# Patient Record
Sex: FEMALE | Race: WHITE | NOT HISPANIC OR LATINO | URBAN - METROPOLITAN AREA
[De-identification: names, ages, dates, MRNs, and addresses within clinical notes are randomized per-mention and may not be internally consistent; named-entity substitution may affect disease eponyms.]

---

## 2021-09-04 ENCOUNTER — EMERGENCY (EMERGENCY)
Facility: HOSPITAL | Age: 56
LOS: 1 days | Discharge: HOME | End: 2021-09-04
Attending: EMERGENCY MEDICINE | Admitting: EMERGENCY MEDICINE
Payer: MEDICARE

## 2021-09-04 VITALS
SYSTOLIC BLOOD PRESSURE: 153 MMHG | HEIGHT: 57 IN | TEMPERATURE: 98 F | DIASTOLIC BLOOD PRESSURE: 78 MMHG | HEART RATE: 85 BPM | RESPIRATION RATE: 20 BRPM | OXYGEN SATURATION: 98 %

## 2021-09-04 VITALS — OXYGEN SATURATION: 98 %

## 2021-09-04 LAB
RAPID RVP RESULT: SIGNIFICANT CHANGE UP
SARS-COV-2 RNA SPEC QL NAA+PROBE: SIGNIFICANT CHANGE UP

## 2021-09-04 PROCEDURE — 99283 EMERGENCY DEPT VISIT LOW MDM: CPT | Mod: 25

## 2021-09-04 PROCEDURE — 0225U NFCT DS DNA&RNA 21 SARSCOV2: CPT

## 2021-09-04 PROCEDURE — 71046 X-RAY EXAM CHEST 2 VIEWS: CPT

## 2021-09-04 PROCEDURE — 94640 AIRWAY INHALATION TREATMENT: CPT

## 2021-09-04 PROCEDURE — 99284 EMERGENCY DEPT VISIT MOD MDM: CPT | Mod: CS

## 2021-09-04 PROCEDURE — 71046 X-RAY EXAM CHEST 2 VIEWS: CPT | Mod: 26

## 2021-09-04 RX ORDER — ALBUTEROL 90 UG/1
1 AEROSOL, METERED ORAL ONCE
Refills: 0 | Status: COMPLETED | OUTPATIENT
Start: 2021-09-04 | End: 2021-09-04

## 2021-09-04 RX ORDER — ALBUTEROL 90 UG/1
2.5 AEROSOL, METERED ORAL ONCE
Refills: 0 | Status: COMPLETED | OUTPATIENT
Start: 2021-09-04 | End: 2021-09-04

## 2021-09-04 RX ORDER — GUAIFENESIN/DEXTROMETHORPHAN 600MG-30MG
10 TABLET, EXTENDED RELEASE 12 HR ORAL ONCE
Refills: 0 | Status: COMPLETED | OUTPATIENT
Start: 2021-09-04 | End: 2021-09-04

## 2021-09-04 RX ADMIN — Medication 10 MILLILITER(S): at 21:20

## 2021-09-04 RX ADMIN — ALBUTEROL 1 PUFF(S): 90 AEROSOL, METERED ORAL at 23:33

## 2021-09-04 RX ADMIN — ALBUTEROL 2.5 MILLIGRAM(S): 90 AEROSOL, METERED ORAL at 21:26

## 2021-09-04 RX ADMIN — Medication 60 MILLIGRAM(S): at 21:19

## 2021-09-04 NOTE — ED ADULT NURSE NOTE - NSICDXPASTMEDICALHX_GEN_ALL_CORE_FT
PAST MEDICAL HISTORY:  Achilles tendonitis     Acne     Cerebral Palsy     Dry eye syndrome     H/O chronic gastritis     Menopause     Mental Retardation     Mild intellectual disability     Osteopenia     Seizure

## 2021-09-04 NOTE — ED ADULT NURSE NOTE - CHPI ED NUR DURATION
Patient was informed that enalapril and HCTZ were both stopped. Per patient he reports misunderstanding provider and continued taking HCTZ, but stopped enalapril.     Due to propranolol being a high dose I explained to him that a message will be sent to provider. He does have enough to last until Monday.    Patient informed to elevate legs as much as possible.   week(s)

## 2021-09-04 NOTE — ED PROVIDER NOTE - OBJECTIVE STATEMENT
55 y.o. F BIB aide from group home for 1 wk cough, seen 5d ago at [facility], negative covid, started nasal spray, cough persists, no fever, no known sick contacts, no travel, not taking anything else for cough, feels better in shower

## 2021-09-04 NOTE — ED ADULT NURSE NOTE - OBJECTIVE STATEMENT
as per attendant pt with cough x1 week. vomited x1 today. pt awake and alert. airway patent. able to control her secretions. occasional cough noted. no respiratory distress

## 2021-09-04 NOTE — ED ADULT NURSE REASSESSMENT NOTE - NS ED NURSE REASSESS COMMENT FT1
pt and attendant instructed on use of inhaler and spacer. returned demonstration. d/c to attendant in NAD. ambulated unassisted. vs as charted

## 2021-09-04 NOTE — ED ADULT NURSE NOTE - NSIMPLEMENTINTERV_GEN_ALL_ED
Implemented All Fall with Harm Risk Interventions:  [place] to call system. Call bell, personal items and telephone within reach. Instruct patient to call for assistance. Room bathroom lighting operational. Non-slip footwear when patient is off stretcher. Physically safe environment: no spills, clutter or unnecessary equipment. Stretcher in lowest position, wheels locked, appropriate side rails in place. Provide visual cue, wrist band, yellow gown, etc. Monitor gait and stability. Monitor for mental status changes and reorient to person, place, and time. Review medications for side effects contributing to fall risk. Reinforce activity limits and safety measures with patient and family. Provide visual clues: red socks.

## 2021-09-04 NOTE — ED PROVIDER NOTE - CLINICAL SUMMARY MEDICAL DECISION MAKING FREE TEXT BOX
cough x 1 wk, not improving, sounds bronchitic, will check rvp, cxr r/o infiltrate, try albuterol, start steroids

## 2021-09-04 NOTE — ED PROVIDER NOTE - NSFOLLOWUPINSTRUCTIONS_ED_ALL_ED_FT
Recommend an over-the-counter cough/cold medication like Robitussin or Dayquil - see administration instructions on bottle      Acute Bronchitis, Adult       Acute bronchitis is when air tubes in the lungs (bronchi) suddenly get swollen. The condition can make it hard for you to breathe. In adults, acute bronchitis usually goes away within 2 weeks. A cough caused by bronchitis may last up to 3 weeks. Smoking, allergies, and asthma can make the condition worse.      What are the causes?  This condition is caused by:  •Cold and flu viruses. The most common cause of this condition is the virus that causes the common cold.       •Bacteria.     •Substances that irritate the lungs, including:   •Smoke from cigarettes and other types of tobacco.      •Dust and pollen.       •Fumes from chemicals, gases, or burned fuel.      •Other materials that pollute indoor or outdoor air.         •Close contact with someone who has acute bronchitis.        What increases the risk?  The following factors may make you more likely to develop this condition:  •A weak body's defense system. This is also called the immune system.       •Any condition that affects your lungs and breathing, such as asthma.        What are the signs or symptoms?  Symptoms of this condition include:  •A cough.      •Coughing up clear, yellow, or green mucus.      •Wheezing.      •Chest congestion.      •Shortness of breath.      •A fever.      •Body aches.      •Chills.      •A sore throat.        How is this treated?  Acute bronchitis may go away over time without treatment. Your doctor may recommend:  •Drinking more fluids.       •Taking a medicine for a fever or cough.       •Using a device that gets medicine into your lungs (inhaler).      •Using a vaporizer or a humidifier. These are machines that add water or moisture in the air to help with coughing and poor breathing.        Follow these instructions at home:     Activity     •Get a lot of rest.      •Avoid places where there are fumes from chemicals.      •Return to your normal activities as told by your doctor. Ask your doctor what activities are safe for you.      Lifestyle     •Drink enough fluids to keep your pee (urine) pale yellow.      • Do not drink alcohol.     • Do not use any products that contain nicotine or tobacco, such as cigarettes, e-cigarettes, and chewing tobacco. If you need help quitting, ask your doctor. Be aware that:  •Your bronchitis will get worse if you smoke or breathe in other people's smoke (secondhand smoke).      •Your lungs will heal faster if you quit smoking.        General instructions     •Take over-the-counter and prescription medicines only as told by your doctor.      •Use an inhaler, cool mist vaporizer, or humidifier as told by your doctor.      •Rinse your mouth often with salt water. To make salt water, dissolve ½–1 tsp (3–6 g) of salt in 1 cup (237 mL) of warm water.      •Keep all follow-up visits as told by your doctor. This is important.        How is this prevented?  To lower your risk of getting this condition again:  •Wash your hands often with soap and water. If soap and water are not available, use hand .      •Avoid contact with people who have cold symptoms.      •Try not to touch your mouth, nose, or eyes with your hands.      •Make sure to get the flu shot every year.        Contact a doctor if:    •Your symptoms do not get better in 2 weeks.      •You vomit more than once or twice.     •You have symptoms of loss of fluid from your body (dehydration). These include:   •Dark urine.       •Dry skin or eyes.      •Increased thirst.       •Headaches.       •Confusion.      •Muscle cramps.          Get help right away if:    •You cough up blood.      •You have chest pain.      •You have very bad shortness of breath.      •You become dehydrated.      •You faint or keep feeling like you are going to faint.      •You keep vomiting.      •You have a very bad headache.      •Your fever or chills get worse.      These symptoms may be an emergency. Do not wait to see if the symptoms will go away. Get medical help right away. Call your local emergency services (911 in the [place] Do not drive yourself to the hospital.       Summary    •Acute bronchitis is when air tubes in the lungs (bronchi) suddenly get swollen. In adults, acute bronchitis usually goes away within 2 weeks.      •Take over-the-counter and prescription medicines only as told by your doctor.      •Drink enough fluid to keep your pee (urine) pale yellow.      •Contact a doctor if your symptoms do not improve after 2 weeks of treatment.      •Get help right away if you cough up blood, faint, or have chest pain or shortness of breath.      This information is not intended to replace advice given to you by your health care provider. Make sure you discuss any questions you have with your health care provider.

## 2021-12-07 ENCOUNTER — EMERGENCY (EMERGENCY)
Facility: HOSPITAL | Age: 56
LOS: 1 days | Discharge: NURSING, REHAB OR LONG-TERM CARE | End: 2021-12-07
Attending: EMERGENCY MEDICINE | Admitting: EMERGENCY MEDICINE
Payer: MEDICARE

## 2021-12-07 VITALS
SYSTOLIC BLOOD PRESSURE: 150 MMHG | DIASTOLIC BLOOD PRESSURE: 92 MMHG | TEMPERATURE: 98 F | WEIGHT: 110.89 LBS | RESPIRATION RATE: 18 BRPM | HEART RATE: 100 BPM | OXYGEN SATURATION: 96 % | HEIGHT: 57 IN

## 2021-12-07 VITALS
TEMPERATURE: 98 F | DIASTOLIC BLOOD PRESSURE: 74 MMHG | OXYGEN SATURATION: 96 % | RESPIRATION RATE: 16 BRPM | HEART RATE: 76 BPM | SYSTOLIC BLOOD PRESSURE: 116 MMHG

## 2021-12-07 PROBLEM — H04.129 DRY EYE SYNDROME OF UNSPECIFIED LACRIMAL GLAND: Chronic | Status: ACTIVE | Noted: 2021-09-04

## 2021-12-07 PROBLEM — M85.80 OTHER SPECIFIED DISORDERS OF BONE DENSITY AND STRUCTURE, UNSPECIFIED SITE: Chronic | Status: ACTIVE | Noted: 2021-09-04

## 2021-12-07 PROBLEM — L70.9 ACNE, UNSPECIFIED: Chronic | Status: ACTIVE | Noted: 2021-09-04

## 2021-12-07 PROBLEM — Z78.0 ASYMPTOMATIC MENOPAUSAL STATE: Chronic | Status: ACTIVE | Noted: 2021-09-04

## 2021-12-07 PROBLEM — F70 MILD INTELLECTUAL DISABILITIES: Chronic | Status: ACTIVE | Noted: 2021-09-04

## 2021-12-07 PROBLEM — Z87.19 PERSONAL HISTORY OF OTHER DISEASES OF THE DIGESTIVE SYSTEM: Chronic | Status: ACTIVE | Noted: 2021-09-04

## 2021-12-07 PROBLEM — M76.60 ACHILLES TENDINITIS, UNSPECIFIED LEG: Chronic | Status: ACTIVE | Noted: 2021-09-04

## 2021-12-07 LAB
ALBUMIN SERPL ELPH-MCNC: 3.8 G/DL — SIGNIFICANT CHANGE UP (ref 3.3–5)
ALP SERPL-CCNC: 97 U/L — SIGNIFICANT CHANGE UP (ref 30–120)
ALT FLD-CCNC: 31 U/L DA — SIGNIFICANT CHANGE UP (ref 10–60)
ANION GAP SERPL CALC-SCNC: 10 MMOL/L — SIGNIFICANT CHANGE UP (ref 5–17)
APPEARANCE UR: CLEAR — SIGNIFICANT CHANGE UP
AST SERPL-CCNC: 31 U/L — SIGNIFICANT CHANGE UP (ref 10–40)
BASOPHILS # BLD AUTO: 0.03 K/UL — SIGNIFICANT CHANGE UP (ref 0–0.2)
BASOPHILS NFR BLD AUTO: 0.5 % — SIGNIFICANT CHANGE UP (ref 0–2)
BILIRUB SERPL-MCNC: 0.3 MG/DL — SIGNIFICANT CHANGE UP (ref 0.2–1.2)
BILIRUB UR-MCNC: NEGATIVE — SIGNIFICANT CHANGE UP
BUN SERPL-MCNC: 18 MG/DL — SIGNIFICANT CHANGE UP (ref 7–23)
CALCIUM SERPL-MCNC: 9.2 MG/DL — SIGNIFICANT CHANGE UP (ref 8.4–10.5)
CHLORIDE SERPL-SCNC: 100 MMOL/L — SIGNIFICANT CHANGE UP (ref 96–108)
CO2 SERPL-SCNC: 27 MMOL/L — SIGNIFICANT CHANGE UP (ref 22–31)
COLOR SPEC: YELLOW — SIGNIFICANT CHANGE UP
CREAT SERPL-MCNC: 0.76 MG/DL — SIGNIFICANT CHANGE UP (ref 0.5–1.3)
DIFF PNL FLD: NEGATIVE — SIGNIFICANT CHANGE UP
EOSINOPHIL # BLD AUTO: 0.16 K/UL — SIGNIFICANT CHANGE UP (ref 0–0.5)
EOSINOPHIL NFR BLD AUTO: 2.6 % — SIGNIFICANT CHANGE UP (ref 0–6)
GLUCOSE SERPL-MCNC: 143 MG/DL — HIGH (ref 70–99)
GLUCOSE UR QL: NEGATIVE MG/DL — SIGNIFICANT CHANGE UP
HCG UR QL: NEGATIVE — SIGNIFICANT CHANGE UP
HCT VFR BLD CALC: 44 % — SIGNIFICANT CHANGE UP (ref 34.5–45)
HGB BLD-MCNC: 14.8 G/DL — SIGNIFICANT CHANGE UP (ref 11.5–15.5)
IMM GRANULOCYTES NFR BLD AUTO: 0.2 % — SIGNIFICANT CHANGE UP (ref 0–1.5)
KETONES UR-MCNC: NEGATIVE — SIGNIFICANT CHANGE UP
LEUKOCYTE ESTERASE UR-ACNC: ABNORMAL
LYMPHOCYTES # BLD AUTO: 1.35 K/UL — SIGNIFICANT CHANGE UP (ref 1–3.3)
LYMPHOCYTES # BLD AUTO: 22.3 % — SIGNIFICANT CHANGE UP (ref 13–44)
MCHC RBC-ENTMCNC: 31 PG — SIGNIFICANT CHANGE UP (ref 27–34)
MCHC RBC-ENTMCNC: 33.6 GM/DL — SIGNIFICANT CHANGE UP (ref 32–36)
MCV RBC AUTO: 92.2 FL — SIGNIFICANT CHANGE UP (ref 80–100)
MONOCYTES # BLD AUTO: 0.45 K/UL — SIGNIFICANT CHANGE UP (ref 0–0.9)
MONOCYTES NFR BLD AUTO: 7.4 % — SIGNIFICANT CHANGE UP (ref 2–14)
NEUTROPHILS # BLD AUTO: 4.05 K/UL — SIGNIFICANT CHANGE UP (ref 1.8–7.4)
NEUTROPHILS NFR BLD AUTO: 67 % — SIGNIFICANT CHANGE UP (ref 43–77)
NITRITE UR-MCNC: NEGATIVE — SIGNIFICANT CHANGE UP
NRBC # BLD: 0 /100 WBCS — SIGNIFICANT CHANGE UP (ref 0–0)
PH UR: 6 — SIGNIFICANT CHANGE UP (ref 5–8)
PLATELET # BLD AUTO: 350 K/UL — SIGNIFICANT CHANGE UP (ref 150–400)
POTASSIUM SERPL-MCNC: 4.2 MMOL/L — SIGNIFICANT CHANGE UP (ref 3.5–5.3)
POTASSIUM SERPL-SCNC: 4.2 MMOL/L — SIGNIFICANT CHANGE UP (ref 3.5–5.3)
PROT SERPL-MCNC: 7.8 G/DL — SIGNIFICANT CHANGE UP (ref 6–8.3)
PROT UR-MCNC: NEGATIVE MG/DL — SIGNIFICANT CHANGE UP
RBC # BLD: 4.77 M/UL — SIGNIFICANT CHANGE UP (ref 3.8–5.2)
RBC # FLD: 13.2 % — SIGNIFICANT CHANGE UP (ref 10.3–14.5)
SODIUM SERPL-SCNC: 137 MMOL/L — SIGNIFICANT CHANGE UP (ref 135–145)
SP GR SPEC: 1.01 — SIGNIFICANT CHANGE UP (ref 1.01–1.02)
UROBILINOGEN FLD QL: NEGATIVE MG/DL — SIGNIFICANT CHANGE UP
WBC # BLD: 6.05 K/UL — SIGNIFICANT CHANGE UP (ref 3.8–10.5)
WBC # FLD AUTO: 6.05 K/UL — SIGNIFICANT CHANGE UP (ref 3.8–10.5)

## 2021-12-07 PROCEDURE — 70450 CT HEAD/BRAIN W/O DYE: CPT | Mod: MA

## 2021-12-07 PROCEDURE — 85025 COMPLETE CBC W/AUTO DIFF WBC: CPT

## 2021-12-07 PROCEDURE — 70450 CT HEAD/BRAIN W/O DYE: CPT | Mod: 26,MA

## 2021-12-07 PROCEDURE — 81025 URINE PREGNANCY TEST: CPT

## 2021-12-07 PROCEDURE — 36415 COLL VENOUS BLD VENIPUNCTURE: CPT

## 2021-12-07 PROCEDURE — 80053 COMPREHEN METABOLIC PANEL: CPT

## 2021-12-07 PROCEDURE — 81001 URINALYSIS AUTO W/SCOPE: CPT

## 2021-12-07 PROCEDURE — 99284 EMERGENCY DEPT VISIT MOD MDM: CPT

## 2021-12-07 PROCEDURE — 99284 EMERGENCY DEPT VISIT MOD MDM: CPT | Mod: 25

## 2021-12-07 PROCEDURE — 96360 HYDRATION IV INFUSION INIT: CPT

## 2021-12-07 RX ORDER — CEFUROXIME AXETIL 250 MG
1 TABLET ORAL
Qty: 14 | Refills: 0
Start: 2021-12-07 | End: 2021-12-13

## 2021-12-07 RX ORDER — SODIUM CHLORIDE 0.65 %
0 AEROSOL, SPRAY (ML) NASAL
Qty: 0 | Refills: 0 | DISCHARGE

## 2021-12-07 RX ORDER — SODIUM CHLORIDE 9 MG/ML
1000 INJECTION INTRAMUSCULAR; INTRAVENOUS; SUBCUTANEOUS ONCE
Refills: 0 | Status: COMPLETED | OUTPATIENT
Start: 2021-12-07 | End: 2021-12-07

## 2021-12-07 RX ORDER — CEFUROXIME AXETIL 250 MG
500 TABLET ORAL ONCE
Refills: 0 | Status: COMPLETED | OUTPATIENT
Start: 2021-12-07 | End: 2021-12-07

## 2021-12-07 RX ADMIN — SODIUM CHLORIDE 1000 MILLILITER(S): 9 INJECTION INTRAMUSCULAR; INTRAVENOUS; SUBCUTANEOUS at 12:15

## 2021-12-07 RX ADMIN — SODIUM CHLORIDE 1000 MILLILITER(S): 9 INJECTION INTRAMUSCULAR; INTRAVENOUS; SUBCUTANEOUS at 10:45

## 2021-12-07 RX ADMIN — Medication 500 MILLIGRAM(S): at 11:14

## 2021-12-07 NOTE — ED ADULT NURSE NOTE - OBJECTIVE STATEMENT
dizziness and headache today  staff and pt deny n/v/d  deny fever or chills or cough. staff states c/o headache and dizziness for past few months per program and they told this new staff member sometimes that is a sign of impending seizure but staff state pt has not had seizure in over a year. pt denies pains pt aaox3 skin warm and dry no resp distress lungs clear and equal b/l ascultation abd soft non tender + bs.  pt ambulatory with steady gait perrl.  pt denies dizziness now. pt does not like going to program because they do not take her out

## 2021-12-07 NOTE — ED PROVIDER NOTE - PATIENT PORTAL LINK FT
You can access the FollowMyHealth Patient Portal offered by [facility] by registering at the following website: [email or web address]. By joining [employer]’s FollowMyHealth portal, you will also be able to view your health information using other applications (apps) compatible with our system.

## 2021-12-07 NOTE — ED PROVIDER NOTE - OBJECTIVE STATEMENT
57 yo F p/w co woke up today with some dizziness, described as spinning sensation. Pt has had hx in past - had a few recent episodes assoc with a headache. no fall / recent trauma. no cough / uri. Pt fully vaccinated for covid, no known exposures. no agg/allev factors. NO other acute co or changes. No abd pain. no v/d. No recent illness. No recent travel/ sick contacts. No dyspnea. No chest pains. no abd pain. No v/d. No change in activity or behavior. no change in appetite / eating. no other acute co.

## 2021-12-07 NOTE — ED PROVIDER NOTE - PROGRESS NOTE DETAILS
Pt doing well, no acute co or changes. Pt is still asymptomatic. Will start abx for poss UTI, pt will fu with PMD asap and will return with any changes or concerns.

## 2021-12-07 NOTE — ED ADULT NURSE NOTE - NSIMPLEMENTINTERV_GEN_ALL_ED
Implemented All Universal Safety Interventions:  [place] to call system. Call bell, personal items and telephone within reach. Instruct patient to call for assistance. Room bathroom lighting operational. Non-slip footwear when patient is off stretcher. Physically safe environment: no spills, clutter or unnecessary equipment. Stretcher in lowest position, wheels locked, appropriate side rails in place.

## 2021-12-07 NOTE — ED PROVIDER NOTE - NSFOLLOWUPINSTRUCTIONS_ED_ALL_ED_FT
1) Follow-up with your Primary Medical Doctor, Dr Piedra. Call today for prompt follow-up.  2) Return to Emergency room for any worsening or persistent pain, weakness, fever, or any other concerning symptoms.  3) See attached instruction sheets for additional information, including information regarding signs and symptoms to look out for, reasons to seek immediate care and other important instructions.  4) Ceftin twice daily for 7 days  5) Plenty of fluids

## 2021-12-07 NOTE — ED PROVIDER NOTE - CLINICAL SUMMARY MEDICAL DECISION MAKING FREE TEXT BOX
Dizzy / vertigo today- resolved. Some recent headaches. Neuro intact, afebrile. check labs, CT, UA, IV

## 2022-05-22 NOTE — ED PROVIDER NOTE - PATIENT PORTAL LINK FT
No
You can access the FollowMyHealth Patient Portal offered by [facility] by registering at the following website: [email or web address]. By joining [employer]’s FollowMyHealth portal, you will also be able to view your health information using other applications (apps) compatible with our system.

## 2022-08-03 ENCOUNTER — INPATIENT (INPATIENT)
Facility: HOSPITAL | Age: 57
LOS: 4 days | Discharge: NURSING, REHAB OR LONG-TERM CARE | DRG: 390 | End: 2022-08-08
Attending: INTERNAL MEDICINE | Admitting: INTERNAL MEDICINE
Payer: MEDICARE

## 2022-08-03 VITALS
WEIGHT: 113.1 LBS | TEMPERATURE: 98 F | OXYGEN SATURATION: 96 % | SYSTOLIC BLOOD PRESSURE: 172 MMHG | HEIGHT: 57 IN | DIASTOLIC BLOOD PRESSURE: 96 MMHG | HEART RATE: 82 BPM | RESPIRATION RATE: 18 BRPM

## 2022-08-03 DIAGNOSIS — K56.609 UNSPECIFIED INTESTINAL OBSTRUCTION, UNSPECIFIED AS TO PARTIAL VERSUS COMPLETE OBSTRUCTION: ICD-10-CM

## 2022-08-03 LAB
ALBUMIN SERPL ELPH-MCNC: 4 G/DL — SIGNIFICANT CHANGE UP (ref 3.3–5)
ALP SERPL-CCNC: 108 U/L — SIGNIFICANT CHANGE UP (ref 30–120)
ALT FLD-CCNC: 38 U/L DA — SIGNIFICANT CHANGE UP (ref 10–60)
ANION GAP SERPL CALC-SCNC: 6 MMOL/L — SIGNIFICANT CHANGE UP (ref 5–17)
APPEARANCE UR: ABNORMAL
APTT BLD: 29.4 SEC — SIGNIFICANT CHANGE UP (ref 27.5–35.5)
AST SERPL-CCNC: 33 U/L — SIGNIFICANT CHANGE UP (ref 10–40)
BACTERIA # UR AUTO: ABNORMAL
BASOPHILS # BLD AUTO: 0.03 K/UL — SIGNIFICANT CHANGE UP (ref 0–0.2)
BASOPHILS NFR BLD AUTO: 0.4 % — SIGNIFICANT CHANGE UP (ref 0–2)
BILIRUB SERPL-MCNC: 0.1 MG/DL — LOW (ref 0.2–1.2)
BILIRUB UR-MCNC: NEGATIVE — SIGNIFICANT CHANGE UP
BUN SERPL-MCNC: 15 MG/DL — SIGNIFICANT CHANGE UP (ref 7–23)
CALCIUM SERPL-MCNC: 9.6 MG/DL — SIGNIFICANT CHANGE UP (ref 8.4–10.5)
CHLORIDE SERPL-SCNC: 101 MMOL/L — SIGNIFICANT CHANGE UP (ref 96–108)
CO2 SERPL-SCNC: 30 MMOL/L — SIGNIFICANT CHANGE UP (ref 22–31)
COLOR SPEC: YELLOW — SIGNIFICANT CHANGE UP
CREAT SERPL-MCNC: 0.83 MG/DL — SIGNIFICANT CHANGE UP (ref 0.5–1.3)
DIFF PNL FLD: NEGATIVE — SIGNIFICANT CHANGE UP
EGFR: 83 ML/MIN/1.73M2 — SIGNIFICANT CHANGE UP
EOSINOPHIL # BLD AUTO: 0.02 K/UL — SIGNIFICANT CHANGE UP (ref 0–0.5)
EOSINOPHIL NFR BLD AUTO: 0.2 % — SIGNIFICANT CHANGE UP (ref 0–6)
EPI CELLS # UR: SIGNIFICANT CHANGE UP
FLUAV AG NPH QL: SIGNIFICANT CHANGE UP
FLUBV AG NPH QL: SIGNIFICANT CHANGE UP
GLUCOSE SERPL-MCNC: 131 MG/DL — HIGH (ref 70–99)
GLUCOSE UR QL: NEGATIVE MG/DL — SIGNIFICANT CHANGE UP
HCG UR QL: NEGATIVE — SIGNIFICANT CHANGE UP
HCT VFR BLD CALC: 41 % — SIGNIFICANT CHANGE UP (ref 34.5–45)
HGB BLD-MCNC: 13.7 G/DL — SIGNIFICANT CHANGE UP (ref 11.5–15.5)
IMM GRANULOCYTES NFR BLD AUTO: 0.2 % — SIGNIFICANT CHANGE UP (ref 0–1.5)
INR BLD: 1.03 RATIO — SIGNIFICANT CHANGE UP (ref 0.88–1.16)
KETONES UR-MCNC: NEGATIVE — SIGNIFICANT CHANGE UP
LACTATE SERPL-SCNC: 1.1 MMOL/L — SIGNIFICANT CHANGE UP (ref 0.7–2)
LEUKOCYTE ESTERASE UR-ACNC: NEGATIVE — SIGNIFICANT CHANGE UP
LIDOCAIN IGE QN: 106 U/L — SIGNIFICANT CHANGE UP (ref 73–393)
LYMPHOCYTES # BLD AUTO: 0.88 K/UL — LOW (ref 1–3.3)
LYMPHOCYTES # BLD AUTO: 10.4 % — LOW (ref 13–44)
MCHC RBC-ENTMCNC: 31.4 PG — SIGNIFICANT CHANGE UP (ref 27–34)
MCHC RBC-ENTMCNC: 33.4 GM/DL — SIGNIFICANT CHANGE UP (ref 32–36)
MCV RBC AUTO: 94 FL — SIGNIFICANT CHANGE UP (ref 80–100)
MONOCYTES # BLD AUTO: 0.44 K/UL — SIGNIFICANT CHANGE UP (ref 0–0.9)
MONOCYTES NFR BLD AUTO: 5.2 % — SIGNIFICANT CHANGE UP (ref 2–14)
NEUTROPHILS # BLD AUTO: 7.11 K/UL — SIGNIFICANT CHANGE UP (ref 1.8–7.4)
NEUTROPHILS NFR BLD AUTO: 83.6 % — HIGH (ref 43–77)
NITRITE UR-MCNC: NEGATIVE — SIGNIFICANT CHANGE UP
NRBC # BLD: 0 /100 WBCS — SIGNIFICANT CHANGE UP (ref 0–0)
PH UR: 7 — SIGNIFICANT CHANGE UP (ref 5–8)
PLATELET # BLD AUTO: 299 K/UL — SIGNIFICANT CHANGE UP (ref 150–400)
POTASSIUM SERPL-MCNC: 3.8 MMOL/L — SIGNIFICANT CHANGE UP (ref 3.5–5.3)
POTASSIUM SERPL-SCNC: 3.8 MMOL/L — SIGNIFICANT CHANGE UP (ref 3.5–5.3)
PROT SERPL-MCNC: 7.7 G/DL — SIGNIFICANT CHANGE UP (ref 6–8.3)
PROT UR-MCNC: 15 MG/DL
PROTHROM AB SERPL-ACNC: 11.9 SEC — SIGNIFICANT CHANGE UP (ref 10.5–13.4)
RBC # BLD: 4.36 M/UL — SIGNIFICANT CHANGE UP (ref 3.8–5.2)
RBC # FLD: 13.4 % — SIGNIFICANT CHANGE UP (ref 10.3–14.5)
RBC CASTS # UR COMP ASSIST: NEGATIVE /HPF — SIGNIFICANT CHANGE UP (ref 0–4)
RSV RNA NPH QL NAA+NON-PROBE: SIGNIFICANT CHANGE UP
SARS-COV-2 RNA SPEC QL NAA+PROBE: SIGNIFICANT CHANGE UP
SODIUM SERPL-SCNC: 137 MMOL/L — SIGNIFICANT CHANGE UP (ref 135–145)
SP GR SPEC: 1.01 — SIGNIFICANT CHANGE UP (ref 1.01–1.02)
TROPONIN I, HIGH SENSITIVITY RESULT: <4 NG/L — SIGNIFICANT CHANGE UP
UROBILINOGEN FLD QL: NEGATIVE MG/DL — SIGNIFICANT CHANGE UP
WBC # BLD: 8.5 K/UL — SIGNIFICANT CHANGE UP (ref 3.8–10.5)
WBC # FLD AUTO: 8.5 K/UL — SIGNIFICANT CHANGE UP (ref 3.8–10.5)
WBC UR QL: SIGNIFICANT CHANGE UP

## 2022-08-03 PROCEDURE — 99222 1ST HOSP IP/OBS MODERATE 55: CPT

## 2022-08-03 PROCEDURE — 99285 EMERGENCY DEPT VISIT HI MDM: CPT | Mod: FS

## 2022-08-03 PROCEDURE — 93010 ELECTROCARDIOGRAM REPORT: CPT

## 2022-08-03 PROCEDURE — 74250 X-RAY XM SM INT 1CNTRST STD: CPT | Mod: 26

## 2022-08-03 PROCEDURE — 74177 CT ABD & PELVIS W/CONTRAST: CPT | Mod: 26,MA

## 2022-08-03 PROCEDURE — 71045 X-RAY EXAM CHEST 1 VIEW: CPT | Mod: 26

## 2022-08-03 RX ORDER — OMEPRAZOLE 10 MG/1
1 CAPSULE, DELAYED RELEASE ORAL
Qty: 0 | Refills: 0 | DISCHARGE

## 2022-08-03 RX ORDER — SODIUM CHLORIDE 9 MG/ML
1000 INJECTION INTRAMUSCULAR; INTRAVENOUS; SUBCUTANEOUS ONCE
Refills: 0 | Status: COMPLETED | OUTPATIENT
Start: 2022-08-03 | End: 2022-08-03

## 2022-08-03 RX ORDER — SODIUM CHLORIDE 9 MG/ML
1000 INJECTION, SOLUTION INTRAVENOUS
Refills: 0 | Status: DISCONTINUED | OUTPATIENT
Start: 2022-08-03 | End: 2022-08-07

## 2022-08-03 RX ORDER — SODIUM CHLORIDE 0.65 %
2 AEROSOL, SPRAY (ML) NASAL
Qty: 0 | Refills: 0 | DISCHARGE

## 2022-08-03 RX ORDER — LAMOTRIGINE 25 MG/1
300 TABLET, ORALLY DISINTEGRATING ORAL
Refills: 0 | Status: DISCONTINUED | OUTPATIENT
Start: 2022-08-03 | End: 2022-08-04

## 2022-08-03 RX ORDER — PSYLLIUM SEED (WITH DEXTROSE)
0 POWDER (GRAM) ORAL
Qty: 0 | Refills: 0 | DISCHARGE

## 2022-08-03 RX ORDER — ALBUTEROL 90 UG/1
0 AEROSOL, METERED ORAL
Qty: 0 | Refills: 0 | DISCHARGE

## 2022-08-03 RX ORDER — FAMOTIDINE 10 MG/ML
20 INJECTION INTRAVENOUS ONCE
Refills: 0 | Status: COMPLETED | OUTPATIENT
Start: 2022-08-03 | End: 2022-08-03

## 2022-08-03 RX ORDER — PRIMIDONE 250 MG/1
250 TABLET ORAL DAILY
Refills: 0 | Status: DISCONTINUED | OUTPATIENT
Start: 2022-08-03 | End: 2022-08-08

## 2022-08-03 RX ORDER — PRIMIDONE 250 MG/1
0 TABLET ORAL
Qty: 0 | Refills: 0 | DISCHARGE

## 2022-08-03 RX ORDER — LAMOTRIGINE 25 MG/1
2 TABLET, ORALLY DISINTEGRATING ORAL
Qty: 0 | Refills: 0 | DISCHARGE

## 2022-08-03 RX ORDER — PRIMIDONE 250 MG/1
500 TABLET ORAL
Refills: 0 | Status: DISCONTINUED | OUTPATIENT
Start: 2022-08-03 | End: 2022-08-08

## 2022-08-03 RX ORDER — ONDANSETRON 8 MG/1
4 TABLET, FILM COATED ORAL ONCE
Refills: 0 | Status: COMPLETED | OUTPATIENT
Start: 2022-08-03 | End: 2022-08-03

## 2022-08-03 RX ORDER — ICOSAPENT ETHYL 500 MG/1
2 CAPSULE, LIQUID FILLED ORAL
Qty: 0 | Refills: 0 | DISCHARGE

## 2022-08-03 RX ADMIN — SODIUM CHLORIDE 1000 MILLILITER(S): 9 INJECTION INTRAMUSCULAR; INTRAVENOUS; SUBCUTANEOUS at 16:31

## 2022-08-03 RX ADMIN — SODIUM CHLORIDE 1000 MILLILITER(S): 9 INJECTION INTRAMUSCULAR; INTRAVENOUS; SUBCUTANEOUS at 12:33

## 2022-08-03 RX ADMIN — SODIUM CHLORIDE 75 MILLILITER(S): 9 INJECTION, SOLUTION INTRAVENOUS at 19:56

## 2022-08-03 RX ADMIN — FAMOTIDINE 20 MILLIGRAM(S): 10 INJECTION INTRAVENOUS at 12:33

## 2022-08-03 RX ADMIN — ONDANSETRON 4 MILLIGRAM(S): 8 TABLET, FILM COATED ORAL at 12:33

## 2022-08-03 NOTE — ED PROVIDER NOTE - NS ED ATTENDING STATEMENT MOD
This was a shared visit with the RAYSHAWN. I reviewed and verified the documentation and independently performed the documented:

## 2022-08-03 NOTE — ED ADULT NURSE REASSESSMENT NOTE - NS ED NURSE REASSESS COMMENT FT1
Contact:  Froylan Reynagat. Manager Phyllis Posada [phone]  Froylan Davis [phone]
Changed securement device attached to nose at patient's request.  IV fluids infusing as ordered.  Call bell within reach.  Safety precautions maintained.

## 2022-08-03 NOTE — H&P ADULT - ASSESSMENT
56-year-old female with history of cerebral palsy, MR, seizures, chronic gastritis, bundle branch block brought in by staff from group home for evaluation of nausea, vomiting and abdominal pain today.  As per staff member patient was nauseous and  vomiting after breakfast this morning and on the way to the hospital started complaining of abdominal pain.  States that patient was recently hospitalized at [facility] for similar problem 3 weeks ago and unsure of diagnosis. Patient is a poor historian and unable to give further information. Denies fever, diarrhea, recent travel, known sick contacts, CP, SOB, urinary symptoms.     # SBO Surgery consult appreciated   Keep patient NPO iv fluids   iv Zofran prn for nausea     # Seizures Continue with Primidone and Lamotrigine

## 2022-08-03 NOTE — ED PROVIDER NOTE - OBJECTIVE STATEMENT
56-year-old female with history of cerebral palsy, MR, seizures, chronic gastritis, bundle branch block brought in by staff from group home for evaluation of nausea, vomiting and abdominal pain today.  As per staff member patient was nauseous and  vomiting after breakfast this morning and on the way to the hospital started complaining of abdominal pain.  States that patient was recently hospitalized at [facility] for similar problem 3 weeks ago and unsure of diagnosis. Patient is a poor historian and unable to give further information. Denies fever, diarrhea, recent travel, known sick contacts, CP, SOB, urinary symptoms.   pcp: Dr. Tye Arguello

## 2022-08-03 NOTE — ED PROVIDER NOTE - NONTENDER LOCATION
no rebound or guarding, multiple well healed surgical scars noted/left upper quadrant/right upper quadrant/left lower quadrant/right lower quadrant/periumbilical/umbilical/suprapubic/left costovertebral angle/right costovertebral angle no rebound, multiple well healed surgical scars noted/left upper quadrant/right upper quadrant/left lower quadrant/right lower quadrant/periumbilical/umbilical/suprapubic/left costovertebral angle/right costovertebral angle

## 2022-08-03 NOTE — H&P ADULT - HISTORY OF PRESENT ILLNESS
56-year-old female with history of cerebral palsy, MR, seizures, chronic gastritis, bundle branch block brought in by staff from group home for evaluation of nausea, vomiting and abdominal pain today.  As per staff member patient was nauseous and  vomiting after breakfast this morning and on the way to the hospital started complaining of abdominal pain.  States that patient was recently hospitalized at [facility] for similar problem 3 weeks ago and unsure of diagnosis. Patient is a poor historian and unable to give further information. Denies fever, diarrhea, recent travel, known sick contacts, CP, SOB, urinary symptoms.

## 2022-08-03 NOTE — ED ADULT NURSE NOTE - OBJECTIVE STATEMENT
Patient states, "I feel lousy.  My stomach hurts and I've been nauseas since this morning."  Patient vomiting on assessment.  History of intellectual disability; group home staff at bedside.  Patient is AOx4 and answering questions appropriately.  Denies CP or SOB. 46 year old female with hx of breast ca for bilateral revision reconstruction

## 2022-08-03 NOTE — ED PROVIDER NOTE - CLINICAL SUMMARY MEDICAL DECISION MAKING FREE TEXT BOX
56-year-old female with history of cerebral palsy mental retardation chronic gastritis brought by staff from [facility] for evaluation of nausea vomiting and abdominal pain today.  Staff member states that patient was recently hospitalized at [facility] for similar problem.  Unclear diagnosis.  History of right bundle branch block.  Patient unable to give any history complains of abdominal pain but denies other symptoms.  PCP is Avolese    VSS Afebrile, NAD  HEENT - clear  PERRL EOMI  Neck supple  lungs clear  Cor S1S2 RR - MGR  Abd soft nontender, no mass or HSM, no rebound, multiple well healed surgical scars.  Ext FROM intact, no edema  Neuro Intact, no deficits.  Skin Warm and dry no rash.    Imp Abd pain. Plan - Labs, Urine, may need CT for further eval.    I performed a history and physical exam of the patient and discussed their management with the advanced care provider. I reviewed the advanced care provider's note and agree with the documented findings and plan of care. My medical decision making and objective findings are found above.

## 2022-08-03 NOTE — ED PROVIDER NOTE - PROGRESS NOTE DETAILS
[de-identified] : Pt following up s/p Sinus, turbs, R SMG sialoendoscopy 10/20/2021, septum was not addressed. Pt states has been doing the saline washes and has been taking meds. \par Pt also states with some crusting \par pt states no improvement in nose as much, feels its the same as before. However, gland with improvement, states R SMG swelling also went down. \par Pt denies clear fluid d/c \par \par pt also with Left BRENDON, and CHl states no improvement, but stable- longstanding. \par no infections \par wanted to hold off on tube last visit.  Pt seen by surgery PA in ED. Spoke to Dr. Thompson who accepted admission. Spoke to surgeon, Dr. Amanda, who advised to admit pt to medicine and will have surgery PA consult pt in ED.

## 2022-08-04 LAB
ANION GAP SERPL CALC-SCNC: 11 MMOL/L — SIGNIFICANT CHANGE UP (ref 5–17)
BUN SERPL-MCNC: 11 MG/DL — SIGNIFICANT CHANGE UP (ref 7–23)
CALCIUM SERPL-MCNC: 9.3 MG/DL — SIGNIFICANT CHANGE UP (ref 8.4–10.5)
CHLORIDE SERPL-SCNC: 98 MMOL/L — SIGNIFICANT CHANGE UP (ref 96–108)
CK SERPL-CCNC: 52 U/L — SIGNIFICANT CHANGE UP (ref 26–192)
CO2 SERPL-SCNC: 28 MMOL/L — SIGNIFICANT CHANGE UP (ref 22–31)
CREAT SERPL-MCNC: 0.92 MG/DL — SIGNIFICANT CHANGE UP (ref 0.5–1.3)
CULTURE RESULTS: SIGNIFICANT CHANGE UP
EGFR: 73 ML/MIN/1.73M2 — SIGNIFICANT CHANGE UP
GLUCOSE BLDC GLUCOMTR-MCNC: 101 MG/DL — HIGH (ref 70–99)
GLUCOSE SERPL-MCNC: 109 MG/DL — HIGH (ref 70–99)
HCV AB S/CO SERPL IA: 0.78 S/CO — SIGNIFICANT CHANGE UP (ref 0–0.99)
HCV AB SERPL-IMP: SIGNIFICANT CHANGE UP
LACTATE SERPL-SCNC: 1 MMOL/L — SIGNIFICANT CHANGE UP (ref 0.7–2)
LACTATE SERPL-SCNC: 5.3 MMOL/L — CRITICAL HIGH (ref 0.7–2)
MRSA PCR RESULT.: SIGNIFICANT CHANGE UP
POTASSIUM SERPL-MCNC: 3.4 MMOL/L — LOW (ref 3.5–5.3)
POTASSIUM SERPL-SCNC: 3.4 MMOL/L — LOW (ref 3.5–5.3)
S AUREUS DNA NOSE QL NAA+PROBE: SIGNIFICANT CHANGE UP
SODIUM SERPL-SCNC: 137 MMOL/L — SIGNIFICANT CHANGE UP (ref 135–145)
SPECIMEN SOURCE: SIGNIFICANT CHANGE UP

## 2022-08-04 PROCEDURE — 99231 SBSQ HOSP IP/OBS SF/LOW 25: CPT

## 2022-08-04 PROCEDURE — 99291 CRITICAL CARE FIRST HOUR: CPT

## 2022-08-04 PROCEDURE — 74019 RADEX ABDOMEN 2 VIEWS: CPT | Mod: 26

## 2022-08-04 RX ORDER — LEVETIRACETAM 250 MG/1
500 TABLET, FILM COATED ORAL
Refills: 0 | Status: DISCONTINUED | OUTPATIENT
Start: 2022-08-04 | End: 2022-08-04

## 2022-08-04 RX ORDER — LEVETIRACETAM 250 MG/1
500 TABLET, FILM COATED ORAL ONCE
Refills: 0 | Status: COMPLETED | OUTPATIENT
Start: 2022-08-04 | End: 2022-08-04

## 2022-08-04 RX ORDER — ACETAMINOPHEN 500 MG
1000 TABLET ORAL ONCE
Refills: 0 | Status: COMPLETED | OUTPATIENT
Start: 2022-08-04 | End: 2022-08-04

## 2022-08-04 RX ORDER — DIAZEPAM 5 MG
2.5 TABLET ORAL ONCE
Refills: 0 | Status: DISCONTINUED | OUTPATIENT
Start: 2022-08-04 | End: 2022-08-04

## 2022-08-04 RX ORDER — LEVETIRACETAM 250 MG/1
500 TABLET, FILM COATED ORAL EVERY 12 HOURS
Refills: 0 | Status: DISCONTINUED | OUTPATIENT
Start: 2022-08-04 | End: 2022-08-05

## 2022-08-04 RX ADMIN — SODIUM CHLORIDE 75 MILLILITER(S): 9 INJECTION, SOLUTION INTRAVENOUS at 05:19

## 2022-08-04 RX ADMIN — Medication 2.5 MILLIGRAM(S): at 14:07

## 2022-08-04 RX ADMIN — LEVETIRACETAM 400 MILLIGRAM(S): 250 TABLET, FILM COATED ORAL at 14:14

## 2022-08-04 RX ADMIN — LEVETIRACETAM 400 MILLIGRAM(S): 250 TABLET, FILM COATED ORAL at 17:49

## 2022-08-04 RX ADMIN — Medication 400 MILLIGRAM(S): at 23:32

## 2022-08-04 RX ADMIN — SODIUM CHLORIDE 75 MILLILITER(S): 9 INJECTION, SOLUTION INTRAVENOUS at 17:51

## 2022-08-04 NOTE — CONSULT NOTE ADULT - SUBJECTIVE AND OBJECTIVE BOX
h/o sz npo add keppra 500 bid once able to take Lamictal recommend continue keppra for 24 hours then dc it   bzd for any break thru sz events 
GENERAL SURGERY CONSULT NOTE    HPI:  56 year old female presents to [facility] with group home aide with complaint of nausea, vomiting, dizzyness and abdominal pain occurring after eating breakfast at 8am.  Denies flatus or BM.  Per aide she was seen at [facility] 3 weeks ago for similar complaints and was diagnosed with a "heart issue" though work up (echo and stress test) were normal.    PAST MEDICAL & SURGICAL HISTORY:  Mental Retardation  Cerebral Palsy  Seizure  Mild intellectual disability  Osteopenia  Menopause  Achilles tendonitis  Dry eye syndrome  H/O chronic gastritis  Acne    Per aide and brother patient possibly had a small bowel resection at less than 5 years old, unsure of indication      Review of Systems:    Patient conversational, but not a good historian    MEDICATIONS:  Metamucil   Multivitamin  Primidone 250mg, 1 tab daily and 2 tabs at bedtime  Paroxetine  Lamotrigene  Rabeprazole  Loratadine  Vitamin D3  Calcium 600+D  Vascepa        Allergies    No Known Allergies        SOCIAL HISTORY          Smoking: Yes [ ]  No [X ]   ______pk yrs          ETOH  Yes [ ]  No [X ]  Social [ ]          DRUGS:  Yes [ ]  No [X ]  if so what______________      Vital Signs Last 24 Hrs  T(C): 36.4 (03 Aug 2022 11:57), Max: 36.4 (03 Aug 2022 11:57)  T(F): 97.5 (03 Aug 2022 11:57), Max: 97.5 (03 Aug 2022 11:57)  HR: 82 (03 Aug 2022 11:57) (82 - 82)  BP: 172/96 (03 Aug 2022 11:57) (172/96 - 172/96)  BP(mean): --  RR: 18 (03 Aug 2022 11:57) (18 - 18)  SpO2: 96% (03 Aug 2022 11:57) (96% - 96%)    Parameters below as of 03 Aug 2022 11:57  Patient On (Oxygen Delivery Method): room air        Physical Exam:    General:  Appears stated age, well-groomed, well-nourished, no distress  Eyes : PERRLA, EOM-I  Chest:  clear breath sounds  Cardiovascular:  Regular rate & rhythm, S1, S2  Abdomen:  soft, NT/ND, + well healed midline surgical scar, eccymosis RLQ (per aide from heparin injection during previous admission), +BS  Extremities: Full ROM    Skin:  eccymosis of RLQ of abdomen        LABS:                        13.7   8.50  )-----------( 299      ( 03 Aug 2022 12:39 )             41.0         137  |  101  |  15  ----------------------------<  131<H>  3.8   |  30  |  0.83    Ca    9.6      03 Aug 2022 12:39    TPro  7.7  /  Alb  4.0  /  TBili  0.1<L>  /  DBili  x   /  AST  33  /  ALT  38  /  AlkPhos  108        Urinalysis Basic - ( 03 Aug 2022 12:39 )    Color: Yellow / Appearance: Turbid / S.010 / pH: x  Gluc: x / Ketone: Negative  / Bili: Negative / Urobili: Negative mg/dL   Blood: x / Protein: 15 mg/dL / Nitrite: Negative   Leuk Esterase: Negative / RBC: Negative /HPF / WBC 0-2   Sq Epi: x / Non Sq Epi: Few / Bacteria: Few        RADIOLOGY & ADDITIONAL STUDIES:    ACC: [id number] EXAM:  CT ABDOMEN AND PELVIS IC                          PROCEDURE DATE:  2022          INTERPRETATION:  CLINICAL INFORMATION: Abdominal pain, vomiting. Recent   hospitalization at outside hospital for similar problem 3 weeks ago.    COMPARISON: None.    CONTRAST/COMPLICATIONS:  IV Contrast: Omnipaque 350  90 cc administered   10 cc discarded  Oral Contrast: NONE  Complications: None reported at time of study completion    PROCEDURE:  CT of the Abdomen and Pelvis was performed.  Sagittal and coronal reformats were performed.    FINDINGS:    LOWER CHEST: No visualized pleural effusion. Evaluation of the lower   thorax is limited due to motion.    LIVER: Liver size within normal limits. Main portal vein and hepatic   veinsare patent.  BILE DUCTS: Mild central biliary prominence compatible with   postcholecystectomy status.  GALLBLADDER: Cholecystectomy.  SPLEEN: Normal size  PANCREAS: No acute peripancreatic inflammation  ADRENALS: Indeterminate 7 mm left adrenal nodule, image 36 series 3.  KIDNEYS/URETERS: No hydronephrosis    BLADDER: Underdistended  REPRODUCTIVE ORGANS: Uterus and adnexa suboptimally characterized on CT.    BOWEL: Limited evaluation due to lack of oral contrast. Stomach is mildly   distended with mural thickening. Correlate for history of gastritis.   Proximal small bowel is nondistended. Mid to distal small bowel is   distended and fecalized with abrupt transition of the right hemiabdomen,   image 46 series 3, concerning for bowel obstruction. Distal small bowel   loops in the right lower quadrant are relatively underdistended. Proximal   to the region of transition, the mid small bowel demonstrate nonspecific   focal thickening, image 100 series 3. Appendix is nondistended and   noninflamed. Mild stool burden of the colon and areas of underdistention   limits evaluation of the colonic mucosa.  PERITONEUM: No ascites or portal/mesenteric venous gas.  VESSELS: No abdominal aortic aneurysm. Atheromatous changes.  RETROPERITONEUM/LYMPH NODES: Small volume nodes.  ABDOMINAL WALL: Postsurgical changes.  BONES: Degenerative changes.    IMPRESSION:    Mid to distal small bowel is distended and fecalized with abrupt   transition of the right hemiabdomen, image 46 series 3, concerning for   bowel obstruction. Distal small bowel loops in the right lower quadrant   are relatively underdistended. Proximal to the region of small bowel   transition, the mid small bowel demonstrate nonspecific focal thickening,   image 100 series 3. CT orMR enterography can be considered for further   characterization. Additional findings of gastritis.  Findings discussed with Dr. Eaton from ER on 8/3/2022 at 2:29 PM    Indeterminate 7 mm left adrenal nodule.    --- End of Report ---            ALIDA CASTELLANOS M.D., ATTENDING RADIOLOGIST  This document has been electronically signed. Aug  3 2022  2:32PM    A/P:  56 year old female with possible dysmotility issues and partial sbo/segmental ileus    Admit to medicine  NG tube  gastrograffin challenge/small bowel series  serial abdominal exams  follow lab work  GI/DVT prophylaxis  Case and plan D/W Dr. Amanda as well as Group home RN Irma Jean Baptiste [phone]
History of Present Illness: The patient is a 56 year old female with a history of seizure d/o, CP, developmental delay, LBBB who presents with abdominal pain and nausea. She noted some upper abdominal pain radiating to chest. There was associated nausea and vomiting. No shortness of breath.    Past Medical/Surgical History:  seizure d/o, CP, developmental delay, LBBB     Medications:  Home Medications:  Calcium 600+D oral tablet: 1 tab(s) orally 2 times a day (03 Aug 2022 12:36)  carbamide peroxide 6.5% otic solution: 5 drop(s) to each affected ear 2 times a day for 7 days once a month (03 Aug 2022 16:07)  lamoTRIgine 150 mg oral tablet: 2 tab(s) orally 2 times a day (03 Aug 2022 16:07)  loratadine 10 mg oral tablet: 1 tab(s) orally once a day (03 Aug 2022 12:36)  Metamucil: 2 cap(s) orally once a day (03 Aug 2022 16:07)  multivitamin: orally once a day (03 Aug 2022 16:07)  PARoxetine 10 mg oral tablet: 1 tab(s) orally once a day (03 Aug 2022 16:07)  primidone 250 mg oral tablet: 1 tab(s) orally once a day (03 Aug 2022 16:07)  primidone 250 mg oral tablet: 2 tab(s) orally once a day (at bedtime) (03 Aug 2022 16:07)  RABEprazole 20 mg oral tablet, extended release: orally once a day (03 Aug 2022 16:07)  Saline Nasal Mist 0.65% nasal solution: 2 drop(s) nasal once a day (03 Aug 2022 16:07)  Vitamin D3 25 mcg (1000 intl units) oral tablet: 1 tab(s) orally once a day (03 Aug 2022 12:36)      Family History: Non-contributory family history of premature cardiovascular atherosclerotic disease    Social History: No tobacco, alcohol or drug use    Review of Systems:  General: No fevers, chills, weight gain  Skin: No rashes, color changes  Cardiovascular: No chest pain, orthopnea  Respiratory: No shortness of breath, cough  Gastrointestinal: No nausea, abdominal pain  Genitourinary: No incontinence, pain with urination  Musculoskeletal: No pain, swelling, decreased range of motion  Neurological: No headache, weakness  Psychiatric: No depression, anxiety  Endocrine: No weight gain, increased thirst  All other systems are comprehensively negative.    Physical Exam:  Vitals:        Vital Signs Last 24 Hrs  T(C): 36.8 (04 Aug 2022 06:12), Max: 36.9 (04 Aug 2022 00:05)  T(F): 98.2 (04 Aug 2022 06:12), Max: 98.5 (04 Aug 2022 00:05)  HR: 65 (04 Aug 2022 06:12) (65 - 82)  BP: 145/77 (04 Aug 2022 06:12) (136/80 - 172/96)  BP(mean): --  RR: 16 (04 Aug 2022 06:12) (16 - 18)  SpO2: 99% (04 Aug 2022 06:12) (96% - 99%)    Parameters below as of 04 Aug 2022 06:12  Patient On (Oxygen Delivery Method): room air      General: NAD  HEENT: MMM  Neck: No JVD, no carotid bruit  Lungs: CTAB  CV: RRR, nl S1/S2, no M/R/G  Abdomen: S/NT/ND, +BS  Extremities: No LE edema, no cyanosis  Neuro: AAOx3, non-focal  Skin: No rash    Labs:                        13.7   8.50  )-----------( 299      ( 03 Aug 2022 12:39 )             41.0     08-03    137  |  101  |  15  ----------------------------<  131<H>  3.8   |  30  |  0.83    Ca    9.6      03 Aug 2022 12:39    TPro  7.7  /  Alb  4.0  /  TBili  0.1<L>  /  DBili  x   /  AST  33  /  ALT  38  /  AlkPhos  108  08-03        PT/INR - ( 03 Aug 2022 19:26 )   PT: 11.9 sec;   INR: 1.03 ratio         PTT - ( 03 Aug 2022 19:26 )  PTT:29.4 sec    ECG: NSR, LBBB

## 2022-08-04 NOTE — CHART NOTE - NSCHARTNOTEFT_GEN_A_CORE
Rapid response called due to altered mental status.   patient had recently been seen talking, walking and acting normally and then found in bed minimally responsive, pulled out NGT, but moving around and occaisionally opening eyes.   appears to be post -ictal, has been missing her lamotrigine and not yet received IV Keppra.     PHYSICAL EXAM:  Vital Signs Last 24 Hrs  T(C): 36.8 (04 Aug 2022 12:30), Max: 36.9 (04 Aug 2022 00:05)  T(F): 98.3 (04 Aug 2022 12:30), Max: 98.5 (04 Aug 2022 00:05)  HR: 68 (04 Aug 2022 12:30) (65 - 68)  BP: 160/82 (04 Aug 2022 12:30) (134/80 - 160/82)  BP(mean): --  RR: 20 (04 Aug 2022 12:30) (16 - 20)  SpO2: 98% (04 Aug 2022 12:30) (97% - 99%)    Parameters below as of 04 Aug 2022 12:30  Patient On (Oxygen Delivery Method): room air    GENERAL: NAD  EYES: conjunctiva and sclera clear  ENMT: Moist mucous membranes  NECK: Supple, moving voluntarily  NERVOUS SYSTEM:  opens eyes to pain, moving around in the bed, non-cooperative with commands  CHEST/LUNG: Clear to auscultation bilaterally;   HEART: Regular rate and rhythm;   ABDOMEN: Soft, Nontender, Nondistended; Bowel sounds present  EXTREMITIES:  2+ Peripheral Pulses, No edema      Placed on tele - sinus tachy with LBBB    Called Dr Howe - recommended check electrolytes, IV Valium 2.5mg x1, IV Keppra 500 X1 now and then give 6pm dose as scheduled.    remote tele monitor to see if she has seizure activity. seizure precautions.     Surgical PA present and aware NGT is out.  See separate note.    Dr Thompson informed of events and plan.  to follow up on electrolytes. Rapid response called due to altered mental status.   patient had recently been seen talking, walking and acting normally and then found in bed minimally responsive, pulled out NGT, but moving around and occaisionally opening eyes.   appears to be post -ictal, has been missing her lamotrigine and not yet received IV Keppra.     PHYSICAL EXAM:  Vital Signs Last 24 Hrs  T(C): 36.8 (04 Aug 2022 12:30), Max: 36.9 (04 Aug 2022 00:05)  T(F): 98.3 (04 Aug 2022 12:30), Max: 98.5 (04 Aug 2022 00:05)  HR: 68 (04 Aug 2022 12:30) (65 - 68)  BP: 160/82 (04 Aug 2022 12:30) (134/80 - 160/82)  BP(mean): --  RR: 20 (04 Aug 2022 12:30) (16 - 20)  SpO2: 98% (04 Aug 2022 12:30) (97% - 99%)    Parameters below as of 04 Aug 2022 12:30  Patient On (Oxygen Delivery Method): room air    GENERAL: NAD  EYES: conjunctiva and sclera clear  ENMT: Moist mucous membranes  NECK: Supple, moving voluntarily  NERVOUS SYSTEM:  opens eyes to pain, moving around in the bed, non-cooperative with commands  CHEST/LUNG: Clear to auscultation bilaterally;   HEART: Regular rate and rhythm;   ABDOMEN: Soft, Nontender, Nondistended; Bowel sounds present  EXTREMITIES:  2+ Peripheral Pulses, No edema      Placed on tele - sinus tachy with LBBB    Called Dr Howe - recommended check electrolytes, IV Valium 2.5mg x1, IV Keppra 500 X1 now and then give 6pm dose as scheduled.    remote tele monitor to see if she has seizure activity. seizure precautions.     Surgical PA present and aware NGT is out.  See separate note.    Dr Thompson informed of events and plan.  to follow up on electrolytes.    Critical care time with patient and speaking with physicians coordinating care approx 40 minutes.

## 2022-08-04 NOTE — CONSULT NOTE ADULT - ASSESSMENT
The patient is a 56 year old female with a history of seizure d/o, CP, developmental delay, LBBB who presents with abdominal pain and nausea in the setting of SBO.    Plan:  - ECG with known LBBB  - Cardiac enzymes negative  - NGT in place  - Continue IV fluids  - Surgery follow-up  - If plan includes any surgical intervention, the patient is optimized to proceed from a cardiac standpoint
Surg. Att.   I was called by ER PA re the pt. with nausea and vomiting. Pt is not the best of historian due to cerebral palsy and lives in a Nursing Home, but i reviewed her medical /surgical history which is quite extensive.   I also reviewed her ct with Dr. Varela. Based on this she may have some dysmotility issues and partial sbo/segmental ileus. Colon is filled with gas and feces.  Surgical PANydia is seeing the pt.  After discussing ct with Radiology we agree the pt should receive gastrografin challenge.  Pt should be admitted to Medicine with our follow up.  Full consult to follow.

## 2022-08-04 NOTE — PROGRESS NOTE ADULT - ASSESSMENT
56-year-old female with history of cerebral palsy, MR, seizures, chronic gastritis, bundle branch block brought in by staff from group home for evaluation of nausea, vomiting and abdominal pain today.  As per staff member patient was nauseous and  vomiting after breakfast this morning and on the way to the hospital started complaining of abdominal pain.  States that patient was recently hospitalized at [facility] for similar problem 3 weeks ago and unsure of diagnosis. Patient is a poor historian and unable to give further information. Denies fever, diarrhea, recent travel, known sick contacts, CP, SOB, urinary symptoms.       s/p Post Ictal Neuro following   Seizure meds iv Keppra     # SBO Surgery consult appreciated   Keep patient NPO iv fluids   iv Zofran prn for nausea   Off NGT     # Seizures Keppra

## 2022-08-04 NOTE — PROVIDER CONTACT NOTE (CRITICAL VALUE NOTIFICATION) - ACTION/TREATMENT ORDERED:
MD Thompson made aware, she does not want to give more fluids at this time, to order repeat lactate in 4 hrs

## 2022-08-04 NOTE — PROGRESS NOTE ADULT - SUBJECTIVE AND OBJECTIVE BOX
Patient is a 56y old  Female who presents with a chief complaint of Abdominal pain nausea and vomiting x 1 day (04 Aug 2022 09:19)      SUBJECTIVE / OVERNIGHT EVENTS: s/p RRT AMS   Post ictal   Off Ngt       MEDICATIONS  (STANDING):  lactated ringers. 1000 milliLiter(s) (75 mL/Hr) IV Continuous <Continuous>  levETIRAcetam  IVPB 500 milliGRAM(s) IV Intermittent every 12 hours  PARoxetine 10 milliGRAM(s) Oral daily  primidone 250 milliGRAM(s) Oral daily  primidone 500 milliGRAM(s) Oral <User Schedule>    MEDICATIONS  (PRN):      CAPILLARY BLOOD GLUCOSE      POCT Blood Glucose.: 101 mg/dL (04 Aug 2022 13:53)    I&O's Summary    03 Aug 2022 07:  -  04 Aug 2022 07:00  --------------------------------------------------------  IN: 0 mL / OUT: 500 mL / NET: -500 mL    04 Aug 2022 07:01  -  04 Aug 2022 23:59  --------------------------------------------------------  IN: 0 mL / OUT: 200 mL / NET: -200 mL      T(C): 36.9 (22 @ 21:10), Max: 37 (22 @ 17:14)  HR: 78 (22 @ 21:10) (68 - 80)  BP: 145/70 (22 @ 21:10) (136/76 - 160/82)  RR: 18 (22 @ 21:10) (17 - 20)  SpO2: 100% (22 @ 21:10) (98% - 100%)    PHYSICAL EXAM:  GENERAL: NAD, well-developed  HEAD:  Atraumatic, Normocephalic  EYES: EOMI, PERRLA, conjunctiva and sclera clear  NECK: Supple, No JVD  CHEST/LUNG: Clear to auscultation bilaterally; No wheeze  HEART: Regular rate and rhythm; No murmurs, rubs, or gallops  ABDOMEN: Soft, Nontender, Nondistended; Bowel sounds present  EXTREMITIES:  2+ Peripheral Pulses, No clubbing, cyanosis, or edema  PSYCH: AAOx3  NEUROLOGY: non-focal  SKIN: No rashes or lesions    LABS:                        13.7   8.50  )-----------( 299      ( 03 Aug 2022 12:39 )             41.0     08-04    137  |  98  |  11  ----------------------------<  109<H>  3.4<L>   |  28  |  0.92    Ca    9.3      04 Aug 2022 14:03    TPro  7.7  /  Alb  4.0  /  TBili  0.1<L>  /  DBili  x   /  AST  33  /  ALT  38  /  AlkPhos  108  08-03    PT/INR - ( 03 Aug 2022 19:26 )   PT: 11.9 sec;   INR: 1.03 ratio         PTT - ( 03 Aug 2022 19:26 )  PTT:29.4 sec  CARDIAC MARKERS ( 04 Aug 2022 14:03 )  x     / x     / 52 U/L / x     / x          Urinalysis Basic - ( 03 Aug 2022 12:39 )    Color: Yellow / Appearance: Turbid / S.010 / pH: x  Gluc: x / Ketone: Negative  / Bili: Negative / Urobili: Negative mg/dL   Blood: x / Protein: 15 mg/dL / Nitrite: Negative   Leuk Esterase: Negative / RBC: Negative /HPF / WBC 0-2   Sq Epi: x / Non Sq Epi: Few / Bacteria: Few        RADIOLOGY & ADDITIONAL TESTS:    Imaging Personally Reviewed:    Consultant(s) Notes Reviewed:      Care Discussed with Consultants/Other Providers:

## 2022-08-04 NOTE — PROGRESS NOTE ADULT - SUBJECTIVE AND OBJECTIVE BOX
Consultation    Inpatient consult to Infectious Diseases  Consult performed by: Raiza Hawkins DO  Consult ordered by: Paulina Greene MD           Patient Details:  Date: 8/11/2020   Riccardo Thomson  58year old male  [id number]  [birth year]    Chief Complaint: Right foot pain    Reason for consultation: Cellulitis    History of Presenting Illness: The patient is a 58year old male with PMH significant for T2D on insulin, CKD, HTN and HLD that presents to the hospital with a 3 day history of right foot pain. Patient stated he began noticing pain in his right foot and it progressed to the point where he was unable to place weight on it. Stated he was told he had an ulcer but was unaware of its presence. Denied any fevers, chest pain, shortness of breath, nausea, vomiting, diarrhea, dizziness or lightheadedness. Patient stated he is allergic to PCN but had many years ago. He did not remember his reaction. Of note, patient has been hospitalized last year for osteomyelitis in his 1st and 2nd metatarsal in his left foot. Had a bone culture in the OR that had no growth. Labs in the ED were notable for a CRP 11 and ESR 99. X-ray foot showed no signs of osteomyelitis but foreign body noted in heel. He was started on cefepime and daptomycin which patient states has helped with his pain. Past Medical History:  Past Medical History:   Diagnosis Date   â¢ Arthritis    â¢ Cellulitis of left foot 1/25/2019   â¢ Chronic pain    â¢ Congestive cardiac failure (CMS/HCC)    â¢ Diabetes mellitus (CMS/HCC)    â¢ Essential (primary) hypertension    â¢ HLD (hyperlipidemia) 1/25/2019   â¢ Melanoma (CMS/HCC)    â¢ Neuropathy    â¢ Retinopathy        Past Surgical History:  Past Surgical History:   Procedure Laterality Date   â¢ Foot surgery         Allergies:  ALLERGIES:   Allergen Reactions   â¢ Penicillins Other (See Comments)     Unknown         Past Family History:  No family history on file.     Past Social History:  Social History SURGERY    56 year old female admitted for possible dysmotility issues and partial sbo/segmental ileus    SUBJECTIVE:   Patient seen and examined at bedside.  NG tube in place.  Denies any further N/V, dizzyness or abdominal pain at this time.  Denies flatus or BM.    OBJECTIVE:   T(F): 98.2 (22 @ 06:12), Max: 98.5 (22 @ 00:05)  HR: 65 (22 @ 06:12) (65 - 82)  BP: 145/77 (22 @ 06:12) (136/80 - 172/96)  RR: 16 (22 @ 06:12) (16 - 18)  SpO2: 99% (22 @ 06:12) (96% - 99%)  CAPILLARY BLOOD GLUCOSE        I&O's Detail    03 Aug 2022 07:01  -  04 Aug 2022 07:00  --------------------------------------------------------  IN:  Total IN: 0 mL    OUT:    Nasogastric/Oral tube (mL): 500 mL  Total OUT: 500 mL    Total NET: -500 mL          Physical exam:  General:  NAD, NG tube in place  Chest:  clear breath sounds  Cardiovascular:  Regular rate & rhythm, S1, S2  Abdomen:  soft, NT/ND, + well healed midline surgical scar, ecchymosis RLQ, +BS  Skin:  ecchymosis of RLQ of abdomen    MEDICATIONS  (STANDING):  lactated ringers. 1000 milliLiter(s) (75 mL/Hr) IV Continuous <Continuous>  lamoTRIgine  Oral Tab/Cap - Peds 300 milliGRAM(s) Oral two times a day  PARoxetine 10 milliGRAM(s) Oral daily  primidone 250 milliGRAM(s) Oral daily  primidone 250 milliGRAM(s) Oral at bedtime    MEDICATIONS  (PRN):      LABS:                        13.7   8.50  )-----------( 299      ( 03 Aug 2022 12:39 )             41.0     08-03    137  |  101  |  15  ----------------------------<  131<H>  3.8   |  30  |  0.83    Ca    9.6      03 Aug 2022 12:39    TPro  7.7  /  Alb  4.0  /  TBili  0.1<L>  /  DBili  x   /  AST  33  /  ALT  38  /  AlkPhos  108  08-    PT/INR - ( 03 Aug 2022 19:26 )   PT: 11.9 sec;   INR: 1.03 ratio         PTT - ( 03 Aug 2022 19:26 )  PTT:29.4 sec  Urinalysis Basic - ( 03 Aug 2022 12:39 )    Color: Yellow / Appearance: Turbid / S.010 / pH: x  Gluc: x / Ketone: Negative  / Bili: Negative / Urobili: Negative mg/dL   Blood: x / Protein: 15 mg/dL / Nitrite: Negative   Leuk Esterase: Negative / RBC: Negative /HPF / WBC 0-2   Sq Epi: x / Non Sq Epi: Few / Bacteria: Few        RADIOLOGY & ADDITIONAL STUDIES:  small bowel series report PENDING    Assessment  56 year old female with possible dysmotility issues and partial sbo/segmental ileus    Continue NG tube to low wall suction  follow up results of gastrograffin challenge/small bowel series  serial abdominal exams  Await return of GI function  follow lab work  GI/DVT prophylaxis  Case and plan D/W Dr. Amanda     Tobacco Use   â¢ Smoking status: Heavy Tobacco Smoker     Years: 15.00     Types: Cigarettes   â¢ Smokeless tobacco: Never Used   â¢ Tobacco comment: 2 cigs a day   Substance Use Topics   â¢ Alcohol use: Yes     Alcohol/week: 1.0 standard drinks     Types: 1 Cans of beer per week       Home Medications:  Medications Prior to Admission   Medication Sig Dispense Refill   â¢ sertraline (ZOLOFT) 25 MG tablet Take 25 mg by mouth daily. â¢ furosemide (LASIX) 20 MG tablet Take 20 mg by mouth daily. â¢ oxyCODONE, IMM REL, (ROXICODONE) 15 MG immediate release tablet Take 15-30 mg by mouth every 6 hours as needed for Pain. â¢ senna (SENOKOT) 8.6 MG tablet Take 2 tablets by mouth daily. Do not start before March 17, 2020. 20 tablet 0   â¢ aspirin 81 MG tablet Take 1 tablet by mouth daily. 30 tablet 0   â¢ gabapentin (NEURONTIN) 300 MG capsule Take 2 capsules by mouth at bedtime. 60 capsule 0   â¢ polyethylene glycol (MIRALAX) packet Take 17 g by mouth daily. Do not start before March 17, 2020. 14 packet 0   â¢ amLODIPine (NORVASC) 5 MG tablet Take 5 mg by mouth daily. â¢ Cholecalciferol (VITAMIN D3) 50 mcg (2,000 units) tablet Take 50 mcg by mouth daily. â¢ docusate sodium (COLACE) 100 MG capsule Take 100 mg by mouth 2 times daily as needed for Constipation. â¢ tamsulosin (FLOMAX) 0.4 MG Cap Take 0.4 mg by mouth daily (with dinner). â¢ insulin glargine (LANTUS SOLOSTAR, BASAGLAR) 100 UNIT/ML pen-injector Inject 45 Units into the skin at bedtime. â¢ acetaminophen (TYLENOL) 325 MG tablet Take 650 mg by mouth every 4 hours as needed for Pain (mild). â¢ rosuvastatin (CRESTOR) 40 MG tablet Take 40 mg by mouth at bedtime. â¢ insulin aspart (NOVOLOG PENFILL) 100 UNIT/ML cartridge Inject 25 Units into the skin 3 times daily (before meals). â¢ carvedilol (COREG) 25 MG tablet Take 25 mg by mouth 2 times daily (with meals).          Current Medications:  Current Facility-Administered Medications   Medication Dose Route Frequency Provider Last Rate Last Dose   â¢ polyethylene glycol (MIRALAX) packet 17 g  17 g Oral Daily Adrienne Molina MD   17 g at 08/11/20 0749   â¢ senna (SENOKOT) 17.2 mg  2 tablet Oral Daily Adrienne Molina MD   17.2 mg at 08/11/20 0749   â¢ sertraline (ZOLOFT) tablet 25 mg  25 mg Oral Daily Adrienne Molina MD   25 mg at 08/11/20 0806   â¢ DAPTOmycin (CUBICIN) 420 mg in sodium chloride 0.9 % 100 mL IVPB  4 mg/kg (Adjusted) Intravenous Q24H Maureen Sandoval MD   Stopped at 08/10/20 1710   â¢ cefepime (MAXIPIME) 2,000 mg in sodium chloride 0.9 % 100 mL IVPB  2,000 mg Intravenous 3 times per day Maureen Sandoval  mL/hr at 08/11/20 0553 2,000 mg at 08/11/20 0553   â¢ acetaminophen (TYLENOL) tablet 650 mg  650 mg Oral Q4H PRN Maureen Sandoval MD   650 mg at 08/11/20 0749   â¢ amLODIPine (NORVASC) tablet 5 mg  5 mg Oral Daily Maureen Sandoval MD   5 mg at 08/11/20 0806   â¢ aspirin (ECOTRIN) enteric coated tablet 81 mg  81 mg Oral Daily Maureen Sandoval MD   81 mg at 08/11/20 0749   â¢ carvedilol (COREG) tablet 25 mg  25 mg Oral BID WC Maureen Sandoval MD   25 mg at 08/11/20 0749   â¢ gabapentin (NEURONTIN) capsule 600 mg  600 mg Oral QHS Maureen Saleh MD   600 mg at 08/10/20 2102   â¢ atorvastatin (LIPITOR) tablet 80 mg  80 mg Oral Nightly Maureen Saleh MD   80 mg at 08/10/20 2147   â¢ tamsulosin (FLOMAX) capsule 0.4 mg  0.4 mg Oral Daily with dinner Maureen Saleh MD       â¢ sodium chloride 0.9 % flush bag 25 mL  25 mL Intravenous PRN Maureen Saleh MD       â¢ sodium chloride (PF) 0.9 % injection 2 mL  2 mL Intracatheter 2 times per day Maureen Sandoval MD   2 mL at 08/11/20 0749   â¢ dextrose 50 % injection 25 g  25 g Intravenous PRN Althea Emanuel MD       â¢ dextrose 50 % injection 12.5 g  12.5 g Intravenous PRN Maureen Saleh MD       â¢ dextrose 5 % infusion   Intravenous Continuous PRN Maureen Fall MD       â¢ glucagon (GLUCAGEN) injection 1 mg  1 mg Intramuscular PRN Maureen Perez MD       â¢ dextrose (GLUTOSE) 40 % gel 15 g  15 g Oral PRN Maureen Fall MD       â¢ dextrose (GLUTOSE) 40 % gel 30 g  30 g Oral PRN Maureen Fall MD       â¢ enoxaparin (LOVENOX) injection 40 mg  40 mg Subcutaneous 2 times per day Maureen Perez MD   40 mg at 08/11/20 0749   â¢ insulin lispro (HumaLOG) scheduled dose AND correction dose   Subcutaneous TID WC Maureen Perez MD   2 Units at 08/11/20 0846   â¢ sodium chloride 0.9% infusion   Intravenous Continuous Maureen Perez MD 75 mL/hr at 08/10/20 1750     â¢ insulin glargine (LANTUS) injection 10 Units  10 Units Subcutaneous Nightly Maureen Perez MD   10 Units at 08/10/20 2147   â¢ insulin lispro (HumaLOG) injection 3 Units  3 Units Subcutaneous TID [facility] Maureen Fall MD   3 Units at 08/11/20 0845       Review of Systems:  A comprehensive 13 point ROS was done and is as per HPI otherwise negative. Vitals:  Vital Last Value 24 Hour Range   Temperature 97.7 Â°F (36.5 Â°C) Temp  Min: 97.2 Â°F (36.2 Â°C)  Max: 98.4 Â°F (36.9 Â°C)   Pulse 65 Pulse  Min: 65  Max: 83   Respiratory 18 Resp  Min: 15  Max: 20   Blood Pressure 101/53 BP  Min: 101/53  Max: 162/86   Pulse Oximetry 95 % SpO2  Min: 95 %  Max: 98 %   CVP   No data recorded     Vital Today Admit   Weight (!) 154.2 kg (340 lb) Weight: (!) 154.2 kg (340 lb)   Height N/A Height: 6' (182.9 cm)   BMI N/A BMI (Calculated): 46.11       Intake/Output Summary (Last 24 hours) at 8/11/2020 0849  Last data filed at 8/11/2020 0800  Gross per 24 hour   Intake 112.5 ml   Output 1501 ml   Net -1388.5 ml       Physical Exam  Constitutional:       Appearance: He is obese. HENT:      Head: Normocephalic and atraumatic. Nose: Nose normal.   Eyes:      General: No scleral icterus.      Extraocular Movements: Extraocular movements intact. Conjunctiva/sclera: Conjunctivae normal.      Pupils: Pupils are equal, round, and reactive to light. Cardiovascular:      Rate and Rhythm: Normal rate and regular rhythm. Pulses: Normal pulses. Heart sounds: Normal heart sounds. Pulmonary:      Effort: Pulmonary effort is normal.      Comments: Coarse expiratory breath sounds R>L  Skin:     Comments: Closed diabetic ulcer on the bottom of the right forefoot with surrounding erythema and swelling. Fluctuant mass palpated along with tenderness to palpation. Neurological:      Mental Status: He is alert. Laboratory Results:  Recent Labs   Lab 08/11/20  0805 08/10/20  1440   WBC 7.1 9.5   HCT 31.4* 30.5*   HGB 9.6* 9.7*    169   ANEUT  --  6.9   ALYMS  --  1.6   AJITH  --  0.8   AEOS  --  0.1   ABASO  --  0.0   SODIUM 136 135   POTASSIUM 4.0 4.2   CHLORIDE 106 103   CO2 24 28   GLUCOSE 189* 200*   BUN 25* 35*   CREATININE 0.98 1.15   AST 13 14   GPT 23 24   ALKPT 122* 131*   BILIRUBIN 0.7 0.7   CALCIUM 8.9 8.8   ALBUMIN 3.0* 3.1*   LACTA  --  0.7   MG 2.4  --        Radiology:  XR FOOT MIN 3 VIEWS RIGHT   Final Result          1. No radiographic evidence of acute osteomyelitis. 2.  Two foreign bodies within the lateral plantar soft tissues at the level of the distal calcaneus. Electronically Signed by: Shagufta James M.D. Signed on: 8/10/2020 3:48 PM              Assessment and Plan:  #Diabetic foot ulcer with surrounding cellulitis vs osteomyelitis  -Hx of left foot osteomyeliits, elevated ESR and CRP, XR right foot negative for osteomyelitis but presence of foreign body  -Recommend MRI right foot to evaluate potential abscess and/or underlying osteomyelitis  -Recommend ortho consult for foreign body seen on X-ray  -Continue daptomycin and cefepime for now    Discussed w Dr J Carlos Lloyd, D.O.    Internal Medicine Resident

## 2022-08-04 NOTE — PROVIDER CONTACT NOTE (CHANGE IN STATUS NOTIFICATION) - ASSESSMENT
Pt lethargic, arousable to voice, opens eyes, not answering questions appears altered, VSS Telephone Encounter by Mary Jane Grigsby, RN at 01/02/18 03:36 PM     Author:  Mary Jane Grigsby, RN Service:  (none) Author Type:  Registered Nurse     Filed:  01/02/18 03:43 PM Encounter Date:  11/3/2017 Status:  Signed     :  Mary Jane Grigsby RN (Registered Nurse)            Patient notified. Of message below[SG1.1T] Dr. SMITH   Offered appointment to discuss surgery   Would prefer appointment with Dr. SMITH :  1.19.18 Friday @ [facility] site or 1.29.18 12:30 pm @ [facility] ?[SG1.1M]        Patient stated the following :[SG1.1T] She would prefer 1.29.18 Dr. SMITH 12:30 pm @ [facility] site   Confirmed site with patient[SG1.1M]      Patient stated the following :[SG1.1T] she was previous patient of Dr. SMITH , she appreciates [place] appointment , this day works well for her.[SG1.1M]   Ana verbalized understanding of information given.[SG1.1T]    Booked appointment  Dr. SMITH 1.29.18 Monday @ 12:30 pm[SG1.1M]       Revision History        User Key Date/Time User Provider Type Action    > SG1.1 01/02/18 03:43 PM Mary Jane Grigsby, RN Registered Nurse Sign    M - Manual, T - Template

## 2022-08-04 NOTE — CHART NOTE - NSCHARTNOTEFT_GEN_A_CORE
Small bowel series and follow up abdominal xray reviewed with Dr. Amanda, contrast visualized in colon.  In light of these findings patient does not have small bowel obstruction, possible dysmotility issues.  Recommend further workup/evaluation by GI.  Of note, patient removed NG tube during seizure episode, OK to maintain NPO without NG tube, further diet advancement per GI. Small bowel series and follow up abdominal xray reviewed with Dr. Amanda, contrast visualized in colon.  In light of these findings patient does not have small bowel obstruction, possible dysmotility issues.  Recommend further workup/evaluation by GI.  Of note, patient removed NG tube during seizure episode, OK to maintain NPO without NG tube, further diet advancement per Medical team.  Surg. Att.  I reviewed UGI series.I agree with above statement. Diet as per medical /GI team.

## 2022-08-04 NOTE — PATIENT PROFILE ADULT - FUNCTIONAL ASSESSMENT - DAILY ACTIVITY SECTION LABEL
. normal... Well appearing, well nourished, awake, alert, oriented to person, place, time/situation and in no apparent distress.

## 2022-08-05 LAB
ANION GAP SERPL CALC-SCNC: 9 MMOL/L — SIGNIFICANT CHANGE UP (ref 5–17)
BASOPHILS # BLD AUTO: 0.04 K/UL — SIGNIFICANT CHANGE UP (ref 0–0.2)
BASOPHILS NFR BLD AUTO: 0.5 % — SIGNIFICANT CHANGE UP (ref 0–2)
BUN SERPL-MCNC: 14 MG/DL — SIGNIFICANT CHANGE UP (ref 7–23)
CALCIUM SERPL-MCNC: 8.7 MG/DL — SIGNIFICANT CHANGE UP (ref 8.4–10.5)
CHLORIDE SERPL-SCNC: 102 MMOL/L — SIGNIFICANT CHANGE UP (ref 96–108)
CO2 SERPL-SCNC: 28 MMOL/L — SIGNIFICANT CHANGE UP (ref 22–31)
CREAT SERPL-MCNC: 0.58 MG/DL — SIGNIFICANT CHANGE UP (ref 0.5–1.3)
EGFR: 106 ML/MIN/1.73M2 — SIGNIFICANT CHANGE UP
EOSINOPHIL # BLD AUTO: 0.16 K/UL — SIGNIFICANT CHANGE UP (ref 0–0.5)
EOSINOPHIL NFR BLD AUTO: 2.1 % — SIGNIFICANT CHANGE UP (ref 0–6)
GLUCOSE SERPL-MCNC: 72 MG/DL — SIGNIFICANT CHANGE UP (ref 70–99)
HCT VFR BLD CALC: 37.4 % — SIGNIFICANT CHANGE UP (ref 34.5–45)
HGB BLD-MCNC: 12.6 G/DL — SIGNIFICANT CHANGE UP (ref 11.5–15.5)
IMM GRANULOCYTES NFR BLD AUTO: 0.1 % — SIGNIFICANT CHANGE UP (ref 0–1.5)
LYMPHOCYTES # BLD AUTO: 1.86 K/UL — SIGNIFICANT CHANGE UP (ref 1–3.3)
LYMPHOCYTES # BLD AUTO: 24.9 % — SIGNIFICANT CHANGE UP (ref 13–44)
MCHC RBC-ENTMCNC: 32 PG — SIGNIFICANT CHANGE UP (ref 27–34)
MCHC RBC-ENTMCNC: 33.7 GM/DL — SIGNIFICANT CHANGE UP (ref 32–36)
MCV RBC AUTO: 94.9 FL — SIGNIFICANT CHANGE UP (ref 80–100)
MONOCYTES # BLD AUTO: 0.7 K/UL — SIGNIFICANT CHANGE UP (ref 0–0.9)
MONOCYTES NFR BLD AUTO: 9.4 % — SIGNIFICANT CHANGE UP (ref 2–14)
NEUTROPHILS # BLD AUTO: 4.69 K/UL — SIGNIFICANT CHANGE UP (ref 1.8–7.4)
NEUTROPHILS NFR BLD AUTO: 63 % — SIGNIFICANT CHANGE UP (ref 43–77)
NRBC # BLD: 0 /100 WBCS — SIGNIFICANT CHANGE UP (ref 0–0)
PLATELET # BLD AUTO: 259 K/UL — SIGNIFICANT CHANGE UP (ref 150–400)
POTASSIUM SERPL-MCNC: 3.7 MMOL/L — SIGNIFICANT CHANGE UP (ref 3.5–5.3)
POTASSIUM SERPL-SCNC: 3.7 MMOL/L — SIGNIFICANT CHANGE UP (ref 3.5–5.3)
RBC # BLD: 3.94 M/UL — SIGNIFICANT CHANGE UP (ref 3.8–5.2)
RBC # FLD: 12.9 % — SIGNIFICANT CHANGE UP (ref 10.3–14.5)
SODIUM SERPL-SCNC: 139 MMOL/L — SIGNIFICANT CHANGE UP (ref 135–145)
WBC # BLD: 7.46 K/UL — SIGNIFICANT CHANGE UP (ref 3.8–10.5)
WBC # FLD AUTO: 7.46 K/UL — SIGNIFICANT CHANGE UP (ref 3.8–10.5)

## 2022-08-05 RX ORDER — LAMOTRIGINE 25 MG/1
300 TABLET, ORALLY DISINTEGRATING ORAL
Refills: 0 | Status: DISCONTINUED | OUTPATIENT
Start: 2022-08-05 | End: 2022-08-08

## 2022-08-05 RX ADMIN — Medication 1000 MILLIGRAM(S): at 00:41

## 2022-08-05 RX ADMIN — LEVETIRACETAM 400 MILLIGRAM(S): 250 TABLET, FILM COATED ORAL at 06:13

## 2022-08-05 RX ADMIN — LAMOTRIGINE 300 MILLIGRAM(S): 25 TABLET, ORALLY DISINTEGRATING ORAL at 15:10

## 2022-08-05 RX ADMIN — Medication 10 MILLIGRAM(S): at 11:27

## 2022-08-05 RX ADMIN — PRIMIDONE 250 MILLIGRAM(S): 250 TABLET ORAL at 11:28

## 2022-08-05 RX ADMIN — PRIMIDONE 500 MILLIGRAM(S): 250 TABLET ORAL at 21:22

## 2022-08-05 NOTE — PROVIDER CONTACT NOTE (MEDICATION) - ACTION/TREATMENT ORDERED:
Restart lamotrigine PO 300mg 2x a day, first dose now. D/C IV Keppra
Dr. Thompson to consult with neurology

## 2022-08-05 NOTE — PROGRESS NOTE ADULT - ASSESSMENT
The patient is a 56 year old female with a history of seizure d/o, CP, developmental delay, LBBB who presents with abdominal pain and nausea in the setting of SBO.    Plan:  - ECG with known LBBB  - Cardiac enzymes negative  - NGT removed  - Continue IV fluids  - Surgery follow-up  - If plan includes any surgical intervention, the patient is optimized to proceed from a cardiac standpoint

## 2022-08-05 NOTE — DIETITIAN INITIAL EVALUATION ADULT - PERTINENT MEDS FT
MEDICATIONS  (STANDING):  lactated ringers. 1000 milliLiter(s) (75 mL/Hr) IV Continuous <Continuous>  lamoTRIgine 300 milliGRAM(s) Oral two times a day  PARoxetine 10 milliGRAM(s) Oral daily  primidone 250 milliGRAM(s) Oral daily  primidone 500 milliGRAM(s) Oral <User Schedule>    MEDICATIONS  (PRN):

## 2022-08-05 NOTE — PROGRESS NOTE ADULT - SUBJECTIVE AND OBJECTIVE BOX
Patient is a 56y old  Female who presents with a chief complaint of Abdominal pain nausea and vomiting x 1 day (04 Aug 2022 09:19)      SUBJECTIVE / OVERNIGHT EVENTS: s/p RRT AMS   Post ictal   Off Ngt       T(C): 36.8 (08-05-22 @ 17:01), Max: 36.8 (08-05-22 @ 17:01)  HR: 70 (08-05-22 @ 17:01) (70 - 73)  BP: 132/79 (08-05-22 @ 17:01) (132/79 - 134/66)  RR: 18 (08-05-22 @ 17:01) (16 - 18)  SpO2: 98% (08-05-22 @ 17:01) (98% - 98%)      MEDICATIONS  (STANDING):  lactated ringers. 1000 milliLiter(s) (75 mL/Hr) IV Continuous <Continuous>  lamoTRIgine 300 milliGRAM(s) Oral two times a day  PARoxetine 10 milliGRAM(s) Oral daily  primidone 250 milliGRAM(s) Oral daily  primidone 500 milliGRAM(s) Oral <User Schedule>    MEDICATIONS  (PRN):    T(C): 36.9 (08-04-22 @ 21:10), Max: 37 (08-04-22 @ 17:14)  HR: 78 (08-04-22 @ 21:10) (68 - 80)  BP: 145/70 (08-04-22 @ 21:10) (136/76 - 160/82)  RR: 18 (08-04-22 @ 21:10) (17 - 20)  SpO2: 100% (08-04-22 @ 21:10) (98% - 100%)    PHYSICAL EXAM:  GENERAL: NAD, well-developed  HEAD:  Atraumatic, Normocephalic  EYES: EOMI, PERRLA, conjunctiva and sclera clear  NECK: Supple, No JVD  CHEST/LUNG: Clear to auscultation bilaterally; No wheeze  HEART: Regular rate and rhythm; No murmurs, rubs, or gallops  ABDOMEN: Soft, Nontender, Nondistended; Bowel sounds present  EXTREMITIES:  2+ Peripheral Pulses, No clubbing, cyanosis, or edema  PSYCH: AAOx3  NEUROLOGY: non-focal  SKIN: No rashes or lesions                            12.6   7.46  )-----------( 259      ( 05 Aug 2022 06:00 )             37.4       CARDIAC MARKERS ( 04 Aug 2022 14:03 )  x     / x     / 52 U/L / x     / x              139|102|14<72  3.7|28|0.58  8.7,--,--  08-05 @ 06:00      RADIOLOGY & ADDITIONAL TESTS:    Imaging Personally Reviewed:    Consultant(s) Notes Reviewed:      Care Discussed with Consultants/Other Providers:

## 2022-08-05 NOTE — PROGRESS NOTE ADULT - SUBJECTIVE AND OBJECTIVE BOX
Neurology follow up note    DMITRI [id number]      Interval History:    Patient feels ok no new complaints.    Allergies    No Known Allergies    Intolerances        MEDICATIONS    lactated ringers. 1000 milliLiter(s) IV Continuous <Continuous>  levETIRAcetam  IVPB 500 milliGRAM(s) IV Intermittent every 12 hours  PARoxetine 10 milliGRAM(s) Oral daily  primidone 250 milliGRAM(s) Oral daily  primidone 500 milliGRAM(s) Oral <User Schedule>              Vital Signs Last 24 Hrs  T(C): 36.8 (05 Aug 2022 00:07), Max: 37 (04 Aug 2022 17:14)  T(F): 98.3 (05 Aug 2022 00:07), Max: 98.6 (04 Aug 2022 17:14)  HR: 82 (05 Aug 2022 00:07) (67 - 82)  BP: 131/83 (05 Aug 2022 00:07) (131/83 - 160/82)  BP(mean): --  RR: 18 (05 Aug 2022 00:07) (16 - 20)  SpO2: 100% (05 Aug 2022 00:07) (97% - 100%)    Parameters below as of 04 Aug 2022 21:10  Patient On (Oxygen Delivery Method): room air    EVIEW OF SYSTEMS:  Constitutional:  The patient denies fever, chills, night sweats.  Head:  No headache.  Eyes:  No double vision or blurry vision.  Ears:  No ringing in the ears.  Neck:  No neck pain.  Cardiovascular:  No chest pain.  Respiratory:  No shortness of breath.  Abdomen:  No nausea, vomiting, but positive abdominal pain which brought her to the emergency room.  Extremities/Neurological:  No numbness or tingling.  Musculoskeletal:  Occasional joint pain.      PHYSICAL EXAMINATION:  HEENT:  Head:  Normocephalic, atraumatic.  Eyes:  No scleral icterus.  Ears:  Hearing bilaterally intact.  NECK:  Supple.  CARDIOVASCULAR:  S1 and S2 heard.  RESPIRATORY:  Good air entry bilaterally.  ABDOMEN:  Soft.  EXTREMITIES:  No clubbing or cyanosis was noted.      NEUROLOGIC:  The patient is awake, alert.  Extraocular movements were intact.  Speech was fluent.  Smile symmetric.  Motor:  Bilateral upper 4/5.  Sensory:  Bilateral upper and lower intact to light touch.                  LABS:  CBC Full  -  ( 05 Aug 2022 06:00 )  WBC Count : 7.46 K/uL  RBC Count : 3.94 M/uL  Hemoglobin : 12.6 g/dL  Hematocrit : 37.4 %  Platelet Count - Automated : 259 K/uL  Mean Cell Volume : 94.9 fl  Mean Cell Hemoglobin : 32.0 pg  Mean Cell Hemoglobin Concentration : 33.7 gm/dL  Auto Neutrophil # : 4.69 K/uL  Auto Lymphocyte # : 1.86 K/uL  Auto Monocyte # : 0.70 K/uL  Auto Eosinophil # : 0.16 K/uL  Auto Basophil # : 0.04 K/uL  Auto Neutrophil % : 63.0 %  Auto Lymphocyte % : 24.9 %  Auto Monocyte % : 9.4 %  Auto Eosinophil % : 2.1 %  Auto Basophil % : 0.5 %    Urinalysis Basic - ( 03 Aug 2022 12:39 )    Color: Yellow / Appearance: Turbid / S.010 / pH: x  Gluc: x / Ketone: Negative  / Bili: Negative / Urobili: Negative mg/dL   Blood: x / Protein: 15 mg/dL / Nitrite: Negative   Leuk Esterase: Negative / RBC: Negative /HPF / WBC 0-2   Sq Epi: x / Non Sq Epi: Few / Bacteria: Few      -    137  |  98  |  11  ----------------------------<  109<H>  3.4<L>   |  28  |  0.92    Ca    9.3      04 Aug 2022 14:03    TPro  7.7  /  Alb  4.0  /  TBili  0.1<L>  /  DBili  x   /  AST  33  /  ALT  38  /  AlkPhos  108  08-03    Hemoglobin A1C:     LIVER FUNCTIONS - ( 03 Aug 2022 12:39 )  Alb: 4.0 g/dL / Pro: 7.7 g/dL / ALK PHOS: 108 U/L / ALT: 38 U/L DA / AST: 33 U/L / GGT: x           Vitamin B12   PT/INR - ( 03 Aug 2022 19:26 )   PT: 11.9 sec;   INR: 1.03 ratio         PTT - ( 03 Aug 2022 19:26 )  PTT:29.4 sec      RADIOLOGY    ANALYSIS AND PLAN:  This is a 56-year-old with history of epilepsy, NPO.  For history of epilepsy, there is no IV form of Lamictal.  We will convert the patient over to Keppra 500 mg twice a day.  I would recommend once the patient able to take her Lamictal again, continue Keppra for 24 hours and discontinue it.  For tremors, we will monitor off primidone.  GI followup and surgical followup as needed.  events noted yesterday possible seizure  event   Greater than 45 minutes of time was spent with the patient, plan of care, reviewing data, with greater than 50% of the visit was spent counseling and/or coordinating care with multidisciplinary healthcare team   Neurology follow up note    DMITRI [id number]      Interval History:    Patient feels ok no new complaints see with AID walking to bathroom abd pain better     Allergies    No Known Allergies    Intolerances        MEDICATIONS    lactated ringers. 1000 milliLiter(s) IV Continuous <Continuous>  levETIRAcetam  IVPB 500 milliGRAM(s) IV Intermittent every 12 hours  PARoxetine 10 milliGRAM(s) Oral daily  primidone 250 milliGRAM(s) Oral daily  primidone 500 milliGRAM(s) Oral <User Schedule>              Vital Signs Last 24 Hrs  T(C): 36.8 (05 Aug 2022 00:07), Max: 37 (04 Aug 2022 17:14)  T(F): 98.3 (05 Aug 2022 00:07), Max: 98.6 (04 Aug 2022 17:14)  HR: 82 (05 Aug 2022 00:07) (67 - 82)  BP: 131/83 (05 Aug 2022 00:07) (131/83 - 160/82)  BP(mean): --  RR: 18 (05 Aug 2022 00:07) (16 - 20)  SpO2: 100% (05 Aug 2022 00:07) (97% - 100%)    Parameters below as of 04 Aug 2022 21:10  Patient On (Oxygen Delivery Method): room air    EVIEW OF SYSTEMS:  Constitutional:  The patient denies fever, chills, night sweats.  Head:  No headache.  Eyes:  No double vision or blurry vision.  Ears:  No ringing in the ears.  Neck:  No neck pain.  Cardiovascular:  No chest pain.  Respiratory:  No shortness of breath.  Abdomen:  No nausea, vomiting, but positive abdominal pain which brought her to the emergency room.  Extremities/Neurological:  No numbness or tingling.  Musculoskeletal:  Occasional joint pain.      PHYSICAL EXAMINATION:  HEENT:  Head:  Normocephalic, atraumatic.  Eyes:  No scleral icterus.  Ears:  Hearing bilaterally intact.  NECK:  Supple.  CARDIOVASCULAR:  S1 and S2 heard.  RESPIRATORY:  Good air entry bilaterally.  ABDOMEN:  Soft.  EXTREMITIES:  No clubbing or cyanosis was noted.      NEUROLOGIC:  The patient is awake, alert.  Extraocular movements were intact.  Speech was fluent.  Smile symmetric.  Motor:  Bilateral upper 4/5.  Sensory:  Bilateral upper and lower intact to light touch.                  LABS:  CBC Full  -  ( 05 Aug 2022 06:00 )  WBC Count : 7.46 K/uL  RBC Count : 3.94 M/uL  Hemoglobin : 12.6 g/dL  Hematocrit : 37.4 %  Platelet Count - Automated : 259 K/uL  Mean Cell Volume : 94.9 fl  Mean Cell Hemoglobin : 32.0 pg  Mean Cell Hemoglobin Concentration : 33.7 gm/dL  Auto Neutrophil # : 4.69 K/uL  Auto Lymphocyte # : 1.86 K/uL  Auto Monocyte # : 0.70 K/uL  Auto Eosinophil # : 0.16 K/uL  Auto Basophil # : 0.04 K/uL  Auto Neutrophil % : 63.0 %  Auto Lymphocyte % : 24.9 %  Auto Monocyte % : 9.4 %  Auto Eosinophil % : 2.1 %  Auto Basophil % : 0.5 %    Urinalysis Basic - ( 03 Aug 2022 12:39 )    Color: Yellow / Appearance: Turbid / S.010 / pH: x  Gluc: x / Ketone: Negative  / Bili: Negative / Urobili: Negative mg/dL   Blood: x / Protein: 15 mg/dL / Nitrite: Negative   Leuk Esterase: Negative / RBC: Negative /HPF / WBC 0-2   Sq Epi: x / Non Sq Epi: Few / Bacteria: Few      08-04    137  |  98  |  11  ----------------------------<  109<H>  3.4<L>   |  28  |  0.92    Ca    9.3      04 Aug 2022 14:03    TPro  7.7  /  Alb  4.0  /  TBili  0.1<L>  /  DBili  x   /  AST  33  /  ALT  38  /  AlkPhos  108  08-03    Hemoglobin A1C:     LIVER FUNCTIONS - ( 03 Aug 2022 12:39 )  Alb: 4.0 g/dL / Pro: 7.7 g/dL / ALK PHOS: 108 U/L / ALT: 38 U/L DA / AST: 33 U/L / GGT: x           Vitamin B12   PT/INR - ( 03 Aug 2022 19:26 )   PT: 11.9 sec;   INR: 1.03 ratio         PTT - ( 03 Aug 2022 19:26 )  PTT:29.4 sec      RADIOLOGY    ANALYSIS AND PLAN:  This is a 56-year-old with history of epilepsy, NPO.  For history of epilepsy, there is no IV form of Lamictal.  We will convert the patient over to Keppra 500 mg twice a day.  I would recommend once the patient able to take her Lamictal again, continue Keppra for 24 hours and discontinue it.  For tremors, we will monitor off primidone.  GI followup and surgical followup as needed.  events noted yesterday possible seizure  event   seizure precautions   Greater than 45 minutes of time was spent with the patient, plan of care, reviewing data, with greater than 50% of the visit was spent counseling and/or coordinating care with multidisciplinary healthcare team

## 2022-08-05 NOTE — PROVIDER CONTACT NOTE (MEDICATION) - SITUATION
Pt with bowel obstruction with hx of seizures on IV Keppra with no IV access, pending midline insertion on Monday.
Pt with SBO, NGT to LCS. Pt NPO

## 2022-08-05 NOTE — DIETITIAN INITIAL EVALUATION ADULT - NSFNSGIIOFT_GEN_A_CORE
08-04-22 @ 07:01  -  08-05-22 @ 07:00  --------------------------------------------------------  OUT:    Nasogastric/Oral tube (mL): 200 mL  Total OUT: 200 mL    Total NET: -200 mL

## 2022-08-05 NOTE — DIETITIAN INITIAL EVALUATION ADULT - PERTINENT LABORATORY DATA
08-05    139  |  102  |  14  ----------------------------<  72  3.7   |  28  |  0.58    Ca    8.7      05 Aug 2022 06:00

## 2022-08-05 NOTE — PROVIDER CONTACT NOTE (OTHER) - SITUATION
pt no longer has iv access, mult RN's have been unsuccessful in obtaining new access.
Pt found during RRT with NGT pulled out.

## 2022-08-05 NOTE — PROVIDER CONTACT NOTE (OTHER) - RECOMMENDATIONS
since patient has been upgraded from NPO to clear liquids, can keppra be changed to PO. Can midline order be placed for iv fluid administration.

## 2022-08-05 NOTE — DIETITIAN INITIAL EVALUATION ADULT - OTHER INFO
Per H&P, pt is a "56-year-old female with history of cerebral palsy, MR, seizures, chronic gastritis, bundle branch block brought in by staff from group home for evaluation of nausea, vomiting and abdominal pain today.  As per staff member patient was nauseous and  vomiting after breakfast this morning and on the way to the hospital started complaining of abdominal pain.  States that patient was recently hospitalized at [facility] for similar problem 3 weeks ago and unsure of diagnosis. Patient is a poor historian and unable to give further information. Denies fever, diarrhea, recent travel, known sick contacts, CP, SOB, urinary symptoms."    Pt seen for nutrition assessment secondary to NPO/clear liquids </ 4 days since admission.  Pt with hx cerebral palsy, MR - admitted from group home.  Pt found to have SBO per CT.  Pt made NPO on admission and NGT placed (removed 8/4).  Diet iniated to clear liquids today, pt tolerating well.  States she is hungry - clear liquid diet reinforced with diet advancement when medically feasible through MD discretion.  Pt reports she is a good eater at group home; no transfer documents in chart indicating diet PTA, pt likely on regular diet based on brief dietary recall.  Endorses occasionally vomiting at group home, pt unable to quantify time frame/frequency.  On LR@75ml/hr.  RD to follow up and will continue to monitor pt's nutrition status.

## 2022-08-05 NOTE — PROVIDER CONTACT NOTE (MEDICATION) - BACKGROUND
Pt has a hx of seizures, on anti-epileptics
Hx of seizure, now on clear liquid diet, tolerating well.

## 2022-08-05 NOTE — PROGRESS NOTE ADULT - SUBJECTIVE AND OBJECTIVE BOX
SURGERY PA NOTE    55 y/o WF with PMHx of CP, MR, LBBB, seizure disorder admitted with abd pain, N/V, CT findings of SBO    SUBJECTIVE:  Patient seen at beside, no complaints at this time.  Reports pain is resolved.  Patient reports having an appetite.  Patient has been NPO since admission, removed NGT yesterday during seizure. Denies N/V, flatus, bowel movement. Reports voiding independently, ambulating.  Patient denies chest pain, shortness of breath, headache, dizziness, fever/chills, dysuria, nausea, vomiting, diarrhea.    OBJECTIVE:   T(F): 97.7 (22 @ 10:09), Max: 98.6 (22 @ 17:14)  HR: 63 (22 @ 10:09) (63 - 82)  BP: 121/72 (22 @ 10:09) (121/60 - 160/82)  RR: 18 (22 @ 10:09) (17 - 20)  SpO2: 98% (22 @ 10:09) (98% - 100%)      CAPILLARY BLOOD GLUCOSE  POCT Blood Glucose.: 101 mg/dL (04 Aug 2022 13:53)    I&O's Detail    04 Aug 2022 07:01  -  05 Aug 2022 07:00  --------------------------------------------------------  IN:  Total IN: 0 mL    OUT:    Nasogastric/Oral tube (mL): 200 mL  Total OUT: 200 mL    Total NET: -200 mL      PHYSICAL EXAM:  General: A+O x 3, NAD  HEENT: PERRLA, EOMs intact, non-icteric  Chest: Clear to auscultation bilaterally, no rales/rhonchi/wheezes noted  Heart: S1, S2 clear, RRR  Abdomen: soft, non-distended, non-tender, BS x 4, no guarding, no rebound, no CVA noted, well-healed midline scar  Extremities: nonedematous bilaterally, warm, no calf tenderness noted     MEDICATIONS  (STANDING):  lactated ringers. 1000 milliLiter(s) (75 mL/Hr) IV Continuous <Continuous>  levETIRAcetam  IVPB 500 milliGRAM(s) IV Intermittent every 12 hours  PARoxetine 10 milliGRAM(s) Oral daily  primidone 250 milliGRAM(s) Oral daily  primidone 500 milliGRAM(s) Oral <User Schedule>    MEDICATIONS  (PRN):      LABS:                        12.6   7.46  )-----------( 259      ( 05 Aug 2022 06:00 )             37.4     08-05    139  |  102  |  14  ----------------------------<  72  3.7   |  28  |  0.58    Ca    8.7      05 Aug 2022 06:00    TPro  7.7  /  Alb  4.0  /  TBili  0.1<L>  /  DBili  x   /  AST  33  /  ALT  38  /  AlkPhos  108  08-03    PT/INR - ( 03 Aug 2022 19:26 )   PT: 11.9 sec;   INR: 1.03 ratio         PTT - ( 03 Aug 2022 19:26 )  PTT:29.4 sec  Urinalysis Basic - ( 03 Aug 2022 12:39 )    Color: Yellow / Appearance: Turbid / S.010 / pH: x  Gluc: x / Ketone: Negative  / Bili: Negative / Urobili: Negative mg/dL   Blood: x / Protein: 15 mg/dL / Nitrite: Negative   Leuk Esterase: Negative / RBC: Negative /HPF / WBC 0-2   Sq Epi: x / Non Sq Epi: Few / Bacteria: Few        RADIOLOGY & ADDITIONAL STUDIES:

## 2022-08-05 NOTE — PROVIDER CONTACT NOTE (OTHER) - ACTION/TREATMENT ORDERED:
Dr. Thompson states she will change the kepra order to PO and will place a midline order.
Surgery aware, as per PA no need for NGT placement.  Will continue to monitor the pt.

## 2022-08-05 NOTE — PROGRESS NOTE ADULT - SUBJECTIVE AND OBJECTIVE BOX
Chief Complaint: Abdominal pain    Interval Events: No events overnight.    Review of Systems:  General: No fevers, chills, weight gain  Skin: No rashes, color changes  Cardiovascular: No chest pain, orthopnea  Respiratory: No shortness of breath, cough  Gastrointestinal: No nausea, abdominal pain  Genitourinary: No incontinence, pain with urination  Musculoskeletal: No pain, swelling, decreased range of motion  Neurological: No headache, weakness  Psychiatric: No depression, anxiety  Endocrine: No weight gain, increased thirst  All other systems are comprehensively negative.    Physical Exam:  Vitals:        Vital Signs Last 24 Hrs  T(C): 36.5 (05 Aug 2022 10:09), Max: 37 (04 Aug 2022 17:14)  T(F): 97.7 (05 Aug 2022 10:09), Max: 98.6 (04 Aug 2022 17:14)  HR: 63 (05 Aug 2022 10:09) (63 - 82)  BP: 121/72 (05 Aug 2022 10:09) (121/60 - 160/82)  BP(mean): --  RR: 18 (05 Aug 2022 10:09) (17 - 20)  SpO2: 98% (05 Aug 2022 10:09) (98% - 100%)  Parameters below as of 05 Aug 2022 10:09  Patient On (Oxygen Delivery Method): room air  General: NAD  HEENT: MMM  Neck: No JVD, no carotid bruit  Lungs: CTAB  CV: RRR, nl S1/S2, no M/R/G  Abdomen: S/NT/ND, +BS  Extremities: No LE edema, no cyanosis  Neuro: AAOx3, non-focal  Skin: No rash    Labs:                        12.6   7.46  )-----------( 259      ( 05 Aug 2022 06:00 )             37.4     08-05    139  |  102  |  14  ----------------------------<  72  3.7   |  28  |  0.58    Ca    8.7      05 Aug 2022 06:00    TPro  7.7  /  Alb  4.0  /  TBili  0.1<L>  /  DBili  x   /  AST  33  /  ALT  38  /  AlkPhos  108  08-03    CARDIAC MARKERS ( 04 Aug 2022 14:03 )  x     / x     / 52 U/L / x     / x          PT/INR - ( 03 Aug 2022 19:26 )   PT: 11.9 sec;   INR: 1.03 ratio         PTT - ( 03 Aug 2022 19:26 )  PTT:29.4 sec

## 2022-08-05 NOTE — PROGRESS NOTE ADULT - ASSESSMENT
56-year-old female with history of cerebral palsy, MR, seizures, chronic gastritis, bundle branch block brought in by staff from group home for evaluation of nausea, vomiting and abdominal pain today.  As per staff member patient was nauseous and  vomiting after breakfast this morning and on the way to the hospital started complaining of abdominal pain.  States that patient was recently hospitalized at [facility] for similar problem 3 weeks ago and unsure of diagnosis. Patient is a poor historian and unable to give further information. Denies fever, diarrhea, recent travel, known sick contacts, CP, SOB, urinary symptoms.       # SBO Surgery consult appreciated   Advance diet to clears     iv Zofran prn for nausea   Off NGT     # Seizures D/C Keppra   Start Primidone

## 2022-08-06 PROCEDURE — 99232 SBSQ HOSP IP/OBS MODERATE 35: CPT | Mod: FS

## 2022-08-06 RX ADMIN — PRIMIDONE 500 MILLIGRAM(S): 250 TABLET ORAL at 22:42

## 2022-08-06 RX ADMIN — LAMOTRIGINE 300 MILLIGRAM(S): 25 TABLET, ORALLY DISINTEGRATING ORAL at 18:19

## 2022-08-06 RX ADMIN — LAMOTRIGINE 300 MILLIGRAM(S): 25 TABLET, ORALLY DISINTEGRATING ORAL at 06:08

## 2022-08-06 RX ADMIN — PRIMIDONE 250 MILLIGRAM(S): 250 TABLET ORAL at 14:23

## 2022-08-06 RX ADMIN — Medication 10 MILLIGRAM(S): at 14:23

## 2022-08-06 NOTE — PROGRESS NOTE ADULT - SUBJECTIVE AND OBJECTIVE BOX
Subjective: pt doing well, tolerating a liquid diet, denies any abdominal pain.    Objective:  Vital Signs Last 24 Hrs  T(C): 36.4 (06 Aug 2022 07:32), Max: 36.8 (05 Aug 2022 17:01)  T(F): 97.6 (06 Aug 2022 07:32), Max: 98.3 (06 Aug 2022 03:00)  HR: 67 (06 Aug 2022 07:32) (67 - 76)  BP: 123/70 (06 Aug 2022 07:32) (118/73 - 134/66)  BP(mean): --  RR: 16 (06 Aug 2022 07:32) (16 - 18)  SpO2: 97% (06 Aug 2022 07:32) (96% - 98%)    Parameters below as of 06 Aug 2022 07:32  Patient On (Oxygen Delivery Method): room air        Heent: SHERIF ALARCON  Pul: clear  Cor: RSR, without murmurs  Abdomen: normal bowel sounds, without distension or tenderness  Extremities: without edema, motor/sensory intact, without calf pain, Homans sign negative.                        12.6   7.46  )-----------( 259      ( 05 Aug 2022 06:00 )             37.4       08-05    139  |  102  |  14  ----------------------------<  72  3.7   |  28  |  0.58    Ca    8.7      05 Aug 2022 06:00

## 2022-08-06 NOTE — PROGRESS NOTE ADULT - SUBJECTIVE AND OBJECTIVE BOX
Neurology follow up note    DMITRI [id number]      Interval History:    Patient feels ok no new complaints.    Allergies    No Known Allergies    Intolerances        MEDICATIONS    lactated ringers. 1000 milliLiter(s) IV Continuous <Continuous>  lamoTRIgine 300 milliGRAM(s) Oral two times a day  PARoxetine 10 milliGRAM(s) Oral daily  primidone 250 milliGRAM(s) Oral daily  primidone 500 milliGRAM(s) Oral <User Schedule>              Vital Signs Last 24 Hrs  T(C): 36.4 (06 Aug 2022 07:32), Max: 36.8 (05 Aug 2022 17:01)  T(F): 97.6 (06 Aug 2022 07:32), Max: 98.3 (06 Aug 2022 03:00)  HR: 67 (06 Aug 2022 07:32) (67 - 76)  BP: 123/70 (06 Aug 2022 07:32) (118/73 - 134/66)  BP(mean): --  RR: 16 (06 Aug 2022 07:32) (16 - 18)  SpO2: 97% (06 Aug 2022 07:32) (96% - 98%)    Parameters below as of 06 Aug 2022 07:32  Patient On (Oxygen Delivery Method): room air      REVIEW OF SYSTEMS:  Constitutional:  The patient denies fever, chills, night sweats.  Head:  No headache.  Eyes:  No double vision or blurry vision.  Ears:  No ringing in the ears.  Neck:  No neck pain.  Cardiovascular:  No chest pain.  Respiratory:  No shortness of breath.  Abdomen:  No nausea, vomiting, but positive abdominal pain which brought her to the emergency room.  Extremities/Neurological:  No numbness or tingling.  Musculoskeletal:  Occasional joint pain.      PHYSICAL EXAMINATION:  HEENT:  Head:  Normocephalic, atraumatic.  Eyes:  No scleral icterus.  Ears:  Hearing bilaterally intact.  NECK:  Supple.  CARDIOVASCULAR:  S1 and S2 heard.  RESPIRATORY:  Good air entry bilaterally.  ABDOMEN:  Soft.  EXTREMITIES:  No clubbing or cyanosis was noted.      NEUROLOGIC:  The patient is awake, alert.  Extraocular movements were intact.  Speech was fluent.  Smile symmetric.  Motor:  Bilateral upper 4/5.  Sensory:  Bilateral upper and lower intact to light touch.                  LABS:  CBC Full  -  ( 05 Aug 2022 06:00 )  WBC Count : 7.46 K/uL  RBC Count : 3.94 M/uL  Hemoglobin : 12.6 g/dL  Hematocrit : 37.4 %  Platelet Count - Automated : 259 K/uL  Mean Cell Volume : 94.9 fl  Mean Cell Hemoglobin : 32.0 pg  Mean Cell Hemoglobin Concentration : 33.7 gm/dL  Auto Neutrophil # : 4.69 K/uL  Auto Lymphocyte # : 1.86 K/uL  Auto Monocyte # : 0.70 K/uL  Auto Eosinophil # : 0.16 K/uL  Auto Basophil # : 0.04 K/uL  Auto Neutrophil % : 63.0 %  Auto Lymphocyte % : 24.9 %  Auto Monocyte % : 9.4 %  Auto Eosinophil % : 2.1 %  Auto Basophil % : 0.5 %      08-05    139  |  102  |  14  ----------------------------<  72  3.7   |  28  |  0.58    Ca    8.7      05 Aug 2022 06:00      Hemoglobin A1C:       Vitamin B12         RADIOLOGY    ANALYSIS AND PLAN:  This is a 56-year-old with history of epilepsy, NPO.  For history of epilepsy, there is no IV form of Lamictal.  We will convert the patient over to Keppra 500 mg twice a day.  I would recommend once the patient able to take her Lamictal again, continue Keppra for 24 hours and discontinue it.  For tremors, we will monitor off primidone.  GI followup and surgical followup as needed.  events noted yesterday possible seizure  event   seizure precautions   Greater than 45 minutes of time was spent with the patient, plan of care, reviewing data, with greater than 50% of the visit was spent counseling and/or coordinating care with multidisciplinary healthcare team

## 2022-08-06 NOTE — PROGRESS NOTE ADULT - SUBJECTIVE AND OBJECTIVE BOX
Patient is a 56y old  Female who presents with a chief complaint of Abdominal pain nausea and vomiting x 1 day (04 Aug 2022 09:19)      SUBJECTIVE / OVERNIGHT EVENTS: s/p RRT AMS   Post ictal   Off Ngt     T(C): 36.4 (08-06-22 @ 23:35), Max: 36.8 (08-06-22 @ 15:12)  HR: 71 (08-06-22 @ 23:35) (71 - 71)  BP: 122/73 (08-06-22 @ 23:35) (113/73 - 122/73)  RR: 17 (08-06-22 @ 23:35) (16 - 17)  SpO2: 97% (08-06-22 @ 23:35) (97% - 97%)primidone 500 milliGRAM(s) Oral <User Schedule>    MEDICATIONS  (PRN):    T(C): 36.9 (08-04-22 @ 21:10), Max: 37 (08-04-22 @ 17:14)  HR: 78 (08-04-22 @ 21:10) (68 - 80)  BP: 145/70 (08-04-22 @ 21:10) (136/76 - 160/82)  RR: 18 (08-04-22 @ 21:10) (17 - 20)  SpO2: 100% (08-04-22 @ 21:10) (98% - 100%)    PHYSICAL EXAM:  GENERAL: NAD, well-developed  HEAD:  Atraumatic, Normocephalic  EYES: EOMI, PERRLA, conjunctiva and sclera clear  NECK: Supple, No JVD  CHEST/LUNG: Clear to auscultation bilaterally; No wheeze  HEART: Regular rate and rhythm; No murmurs, rubs, or gallops  ABDOMEN: Soft, Nontender, Nondistended; Bowel sounds present  EXTREMITIES:  2+ Peripheral Pulses, No clubbing, cyanosis, or edema  PSYCH: AAOx3  NEUROLOGY: non-focal  SKIN: No rashes or lesions                            12.6   7.46  )-----------( 259      ( 05 Aug 2022 06:00 )             37.4       CARDIAC MARKERS ( 04 Aug 2022 14:03 )  x     / x     / 52 U/L / x     / x              139|102|14<72  3.7|28|0.58  8.7,--,--  08-05 @ 06:00      RADIOLOGY & ADDITIONAL TESTS:    Imaging Personally Reviewed:    Consultant(s) Notes Reviewed:      Care Discussed with Consultants/Other Providers:

## 2022-08-06 NOTE — PROGRESS NOTE ADULT - ASSESSMENT
56-year-old female with history of cerebral palsy, MR, seizures, chronic gastritis, bundle branch block brought in by staff from group home for evaluation of nausea, vomiting and abdominal pain today.  As per staff member patient was nauseous and  vomiting after breakfast this morning and on the way to the hospital started complaining of abdominal pain.  States that patient was recently hospitalized at [facility] for similar problem 3 weeks ago and unsure of diagnosis. Patient is a poor historian and unable to give further information. Denies fever, diarrhea, recent travel, known sick contacts, CP, SOB, urinary symptoms.       # SBO Surgery consult appreciated   Advance diet to clears     Advance to full liquids   iv Zofran prn for nausea   Off NGT     # Seizures D/C Keppra   Start Primidone

## 2022-08-06 NOTE — PROGRESS NOTE ADULT - ASSESSMENT
The patient is a 56 year old female with a history of seizure d/o, CP, developmental delay, LBBB who presents with abdominal pain and nausea in the setting of SBO.    Plan:  - ECG with known LBBB  - Cardiac enzymes negative  - NGT removed  - Surgery follow-up

## 2022-08-06 NOTE — PROGRESS NOTE ADULT - SUBJECTIVE AND OBJECTIVE BOX
Chief Complaint: Abdominal pain    Interval Events: No events overnight.    Review of Systems:  General: No fevers, chills, weight gain  Skin: No rashes, color changes  Cardiovascular: No chest pain, orthopnea  Respiratory: No shortness of breath, cough  Gastrointestinal: No nausea, abdominal pain  Genitourinary: No incontinence, pain with urination  Musculoskeletal: No pain, swelling, decreased range of motion  Neurological: No headache, weakness  Psychiatric: No depression, anxiety  Endocrine: No weight gain, increased thirst  All other systems are comprehensively negative.    Physical Exam:  Vital Signs Last 24 Hrs  T(C): 36.4 (06 Aug 2022 07:32), Max: 36.8 (05 Aug 2022 17:01)  T(F): 97.6 (06 Aug 2022 07:32), Max: 98.3 (06 Aug 2022 03:00)  HR: 67 (06 Aug 2022 07:32) (63 - 76)  BP: 123/70 (06 Aug 2022 07:32) (118/73 - 134/66)  BP(mean): --  RR: 16 (06 Aug 2022 07:32) (16 - 18)  SpO2: 97% (06 Aug 2022 07:32) (96% - 98%)  Parameters below as of 06 Aug 2022 07:32  Patient On (Oxygen Delivery Method): room air  General: NAD  HEENT: MMM  Neck: No JVD, no carotid bruit  Lungs: CTAB  CV: RRR, nl S1/S2, no M/R/G  Abdomen: S/NT/ND, +BS  Extremities: No LE edema, no cyanosis  Neuro: AAOx3, non-focal  Skin: No rash    Labs:             08-05    139  |  102  |  14  ----------------------------<  72  3.7   |  28  |  0.58    Ca    8.7      05 Aug 2022 06:00                          12.6   7.46  )-----------( 259      ( 05 Aug 2022 06:00 )             37.4

## 2022-08-07 LAB — SARS-COV-2 RNA SPEC QL NAA+PROBE: SIGNIFICANT CHANGE UP

## 2022-08-07 PROCEDURE — 99232 SBSQ HOSP IP/OBS MODERATE 35: CPT | Mod: GC

## 2022-08-07 RX ADMIN — PRIMIDONE 500 MILLIGRAM(S): 250 TABLET ORAL at 20:23

## 2022-08-07 RX ADMIN — PRIMIDONE 250 MILLIGRAM(S): 250 TABLET ORAL at 11:24

## 2022-08-07 RX ADMIN — Medication 10 MILLIGRAM(S): at 11:24

## 2022-08-07 RX ADMIN — LAMOTRIGINE 300 MILLIGRAM(S): 25 TABLET, ORALLY DISINTEGRATING ORAL at 05:55

## 2022-08-07 RX ADMIN — LAMOTRIGINE 300 MILLIGRAM(S): 25 TABLET, ORALLY DISINTEGRATING ORAL at 17:47

## 2022-08-07 NOTE — PROGRESS NOTE ADULT - SUBJECTIVE AND OBJECTIVE BOX
Neurology follow up note    DMITRI [id number]      Interval History:    Patient feels ok no new complaints.    Allergies    No Known Allergies    Intolerances        MEDICATIONS    lamoTRIgine 300 milliGRAM(s) Oral two times a day  PARoxetine 10 milliGRAM(s) Oral daily  primidone 250 milliGRAM(s) Oral daily  primidone 500 milliGRAM(s) Oral <User Schedule>              T(C): 36.8 (08-07-22 @ 23:30), Max: 36.8 (08-07-22 @ 23:30)  HR: 72 (08- 07-22 @ 23:30) (72 - 81)  BP: 116/75 (08-07-22 @ 23:30) (116/75 - 130/79)  RR: 16 (08-07-22 @ 23:30) (16 - 16)  SpO2: 96% (08-07-22 @ 23:30) (96% - 96%)     REVIEW OF SYSTEMS:  Constitutional:  The patient denies fever, chills, night sweats.  Head:  No headache.  Eyes:  No double vision or blurry vision.  Ears:  No ringing in the ears.  Neck:  No neck pain.  Cardiovascular:  No chest pain.  Respiratory:  No shortness of breath.  Abdomen:  No nausea, vomiting, but positive abdominal pain which brought her to the emergency room.  Extremities/Neurological:  No numbness or tingling.  Musculoskeletal:  Occasional joint pain.      PHYSICAL EXAMINATION:  HEENT:  Head:  Normocephalic, atraumatic.  Eyes:  No scleral icterus.  Ears:  Hearing bilaterally intact.  NECK:  Supple.  CARDIOVASCULAR:  S1 and S2 heard.  RESPIRATORY:  Good air entry bilaterally.  ABDOMEN:  Soft.  EXTREMITIES:  No clubbing or cyanosis was noted.      NEUROLOGIC:  The patient is awake, alert.  Extraocular movements were intact.  Speech was fluent.  Smile symmetric.  Motor:  Bilateral upper 4/5.  Sensory:  Bilateral upper and lower intact to light touch.                                 12.6   7.46  )-----------( 259      ( 05 Aug 2022 06:00 )             37.4       CARDIAC MARKERS ( 04 Aug 2022 14:03 )  x     / x     / 52 U/L / x     / x              139|102|14<72  3.7|28|0.58  8.7,--,--  08-05 @ 06:00               Vitamin B12         RADIOLOGY      ANALYSIS AND PLAN:  This is a 56-year-old with history of epilepsy, NPO.  For history of epilepsy lamictal.    For tremors, we will monitor off primidone.  GI followup and surgical followup as needed.  events noted yesterday possible seizure  event   seizure precautions     Greater than 40 minutes of time was spent with the patient, plan of care, reviewing data, with greater than 50% of the visit was spent counseling and/or coordinating care with multidisciplinary healthcare team

## 2022-08-07 NOTE — PROGRESS NOTE ADULT - SUBJECTIVE AND OBJECTIVE BOX
Subjective:   Pt is very anxious 'I want to go home", she denies any abdominal pain.  Objective:  Vital Signs Last 24 Hrs  T(C): 36.4 (07 Aug 2022 08:07), Max: 36.8 (06 Aug 2022 15:12)  T(F): 97.6 (07 Aug 2022 08:07), Max: 98.2 (06 Aug 2022 15:12)  HR: 65 (07 Aug 2022 08:07) (65 - 71)  BP: 130/76 (07 Aug 2022 08:07) (113/73 - 130/76)  BP(mean): --  RR: 16 (07 Aug 2022 08:07) (16 - 17)  SpO2: 98% (07 Aug 2022 08:07) (96% - 98%)    Parameters below as of 07 Aug 2022 02:52  Patient On (Oxygen Delivery Method): room air        Heent: SHERIF ALARCON  Pul: clear  Cor: RSR, without murmurs  Abdomen: normal bowel sounds, without distension or tenderness.  Extremities: without edema, motor/sensory intact, without calf pain, Homans sign negative.

## 2022-08-07 NOTE — PROGRESS NOTE ADULT - ASSESSMENT
56-year-old female with history of cerebral palsy, MR, seizures, chronic gastritis, bundle branch block brought in by staff from group home for evaluation of nausea, vomiting and abdominal pain today.  As per staff member patient was nauseous and  vomiting after breakfast this morning and on the way to the hospital started complaining of abdominal pain.  States that patient was recently hospitalized at [facility] for similar problem 3 weeks ago and unsure of diagnosis. Patient is a poor historian and unable to give further information. Denies fever, diarrhea, recent travel, known sick contacts, CP, SOB, urinary symptoms.       # SBO Surgery follwoing    Advance diet to regular   iv Zofran prn for nausea   Off NGT     # Seizures D/C Keppra   Start Primidone      D/C planning am tomorrow

## 2022-08-07 NOTE — PROGRESS NOTE ADULT - SUBJECTIVE AND OBJECTIVE BOX
Patient is a 56y old  Female who presents with a chief complaint of Abdominal pain nausea and vomiting x 1 day (04 Aug 2022 09:19)      SUBJECTIVE / OVERNIGHT EVENTS: s/p RRT AMS No events    Off Ngt     T(C): 36.8 (08-07-22 @ 23:30), Max: 36.8 (08-07-22 @ 23:30)  HR: 72 (08- 07-22 @ 23:30) (72 - 81)  BP: 116/75 (08-07-22 @ 23:30) (116/75 - 130/79)  RR: 16 (08-07-22 @ 23:30) (16 - 16)  SpO2: 96% (08-07-22 @ 23:30) (96% - 96%)       MEDICATIONS  (STANDING):  lamoTRIgine 300 milliGRAM(s) Oral two times a day  PARoxetine 10 milliGRAM(s) Oral daily  primidone 250 milliGRAM(s) Oral daily  primidone 500 milliGRAM(s) Oral <User Schedule>    MEDICATIONS  (PRN):          GENERAL: NAD, well-developed  HEAD:  Atraumatic, Normocephalic  EYES: EOMI, PERRLA, conjunctiva and sclera clear  NECK: Supple, No JVD  CHEST/LUNG: Clear to auscultation bilaterally; No wheeze  HEART: Regular rate and rhythm; No murmurs, rubs, or gallops  ABDOMEN: Soft, Nontender, Nondistended; Bowel sounds present  EXTREMITIES:  2+ Peripheral Pulses, No clubbing, cyanosis, or edema  PSYCH: AAOx3  NEUROLOGY: non-focal  SKIN: No rashes or lesions                            12.6   7.46  )-----------( 259      ( 05 Aug 2022 06:00 )             37.4       CARDIAC MARKERS ( 04 Aug 2022 14:03 )  x     / x     / 52 U/L / x     / x              139|102|14<72  3.7|28|0.58  8.7,--,--  08-05 @ 06:00      RADIOLOGY & ADDITIONAL TESTS:    Imaging Personally Reviewed:    Consultant(s) Notes Reviewed:      Care Discussed with Consultants/Other Providers:

## 2022-08-07 NOTE — PROGRESS NOTE ADULT - SUBJECTIVE AND OBJECTIVE BOX
Chief Complaint: Abdominal pain    Interval Events: No events overnight.    Review of Systems:  General: No fevers, chills, weight gain  Skin: No rashes, color changes  Cardiovascular: No chest pain, orthopnea  Respiratory: No shortness of breath, cough  Gastrointestinal: No nausea, abdominal pain  Genitourinary: No incontinence, pain with urination  Musculoskeletal: No pain, swelling, decreased range of motion  Neurological: No headache, weakness  Psychiatric: No depression, anxiety  Endocrine: No weight gain, increased thirst  All other systems are comprehensively negative.    Physical Exam:  Vital Signs Last 24 Hrs  T(C): 36.4 (07 Aug 2022 08:07), Max: 36.8 (06 Aug 2022 15:12)  T(F): 97.6 (07 Aug 2022 08:07), Max: 98.2 (06 Aug 2022 15:12)  HR: 65 (07 Aug 2022 08:07) (65 - 71)  BP: 130/76 (07 Aug 2022 08:07) (113/73 - 130/76)  BP(mean): --  RR: 16 (07 Aug 2022 08:07) (16 - 17)  SpO2: 98% (07 Aug 2022 08:07) (96% - 98%)  Parameters below as of 07 Aug 2022 02:52  Patient On (Oxygen Delivery Method): room air  General: NAD  HEENT: MMM  Neck: No JVD, no carotid bruit  Lungs: CTAB  CV: RRR, nl S1/S2, no M/R/G  Abdomen: S/NT/ND, +BS  Extremities: No LE edema, no cyanosis  Neuro: AAOx3, non-focal  Skin: No rash    Labs:

## 2022-08-08 ENCOUNTER — TRANSCRIPTION ENCOUNTER (OUTPATIENT)
Age: 57
End: 2022-08-08

## 2022-08-08 VITALS
RESPIRATION RATE: 18 BRPM | HEART RATE: 83 BPM | DIASTOLIC BLOOD PRESSURE: 75 MMHG | TEMPERATURE: 98 F | SYSTOLIC BLOOD PRESSURE: 120 MMHG | OXYGEN SATURATION: 96 %

## 2022-08-08 LAB
ALBUMIN SERPL ELPH-MCNC: 3.7 G/DL — SIGNIFICANT CHANGE UP (ref 3.3–5)
ALP SERPL-CCNC: 113 U/L — SIGNIFICANT CHANGE UP (ref 30–120)
ALT FLD-CCNC: 34 U/L DA — SIGNIFICANT CHANGE UP (ref 10–60)
ANION GAP SERPL CALC-SCNC: 8 MMOL/L — SIGNIFICANT CHANGE UP (ref 5–17)
AST SERPL-CCNC: 29 U/L — SIGNIFICANT CHANGE UP (ref 10–40)
BASOPHILS # BLD AUTO: 0.05 K/UL — SIGNIFICANT CHANGE UP (ref 0–0.2)
BASOPHILS NFR BLD AUTO: 0.8 % — SIGNIFICANT CHANGE UP (ref 0–2)
BILIRUB SERPL-MCNC: 0.2 MG/DL — SIGNIFICANT CHANGE UP (ref 0.2–1.2)
BUN SERPL-MCNC: 12 MG/DL — SIGNIFICANT CHANGE UP (ref 7–23)
CALCIUM SERPL-MCNC: 9.3 MG/DL — SIGNIFICANT CHANGE UP (ref 8.4–10.5)
CHLORIDE SERPL-SCNC: 100 MMOL/L — SIGNIFICANT CHANGE UP (ref 96–108)
CO2 SERPL-SCNC: 31 MMOL/L — SIGNIFICANT CHANGE UP (ref 22–31)
CREAT SERPL-MCNC: 0.95 MG/DL — SIGNIFICANT CHANGE UP (ref 0.5–1.3)
CULTURE RESULTS: SIGNIFICANT CHANGE UP
CULTURE RESULTS: SIGNIFICANT CHANGE UP
EGFR: 70 ML/MIN/1.73M2 — SIGNIFICANT CHANGE UP
EOSINOPHIL # BLD AUTO: 0.12 K/UL — SIGNIFICANT CHANGE UP (ref 0–0.5)
EOSINOPHIL NFR BLD AUTO: 2 % — SIGNIFICANT CHANGE UP (ref 0–6)
GLUCOSE SERPL-MCNC: 103 MG/DL — HIGH (ref 70–99)
HCT VFR BLD CALC: 43 % — SIGNIFICANT CHANGE UP (ref 34.5–45)
HGB BLD-MCNC: 14.6 G/DL — SIGNIFICANT CHANGE UP (ref 11.5–15.5)
IMM GRANULOCYTES NFR BLD AUTO: 0.5 % — SIGNIFICANT CHANGE UP (ref 0–1.5)
LYMPHOCYTES # BLD AUTO: 2.05 K/UL — SIGNIFICANT CHANGE UP (ref 1–3.3)
LYMPHOCYTES # BLD AUTO: 34.2 % — SIGNIFICANT CHANGE UP (ref 13–44)
MCHC RBC-ENTMCNC: 31.2 PG — SIGNIFICANT CHANGE UP (ref 27–34)
MCHC RBC-ENTMCNC: 34 GM/DL — SIGNIFICANT CHANGE UP (ref 32–36)
MCV RBC AUTO: 91.9 FL — SIGNIFICANT CHANGE UP (ref 80–100)
MONOCYTES # BLD AUTO: 0.71 K/UL — SIGNIFICANT CHANGE UP (ref 0–0.9)
MONOCYTES NFR BLD AUTO: 11.8 % — SIGNIFICANT CHANGE UP (ref 2–14)
NEUTROPHILS # BLD AUTO: 3.04 K/UL — SIGNIFICANT CHANGE UP (ref 1.8–7.4)
NEUTROPHILS NFR BLD AUTO: 50.7 % — SIGNIFICANT CHANGE UP (ref 43–77)
NRBC # BLD: 0 /100 WBCS — SIGNIFICANT CHANGE UP (ref 0–0)
PLATELET # BLD AUTO: 347 K/UL — SIGNIFICANT CHANGE UP (ref 150–400)
POTASSIUM SERPL-MCNC: 3.8 MMOL/L — SIGNIFICANT CHANGE UP (ref 3.5–5.3)
POTASSIUM SERPL-SCNC: 3.8 MMOL/L — SIGNIFICANT CHANGE UP (ref 3.5–5.3)
PROT SERPL-MCNC: 7.7 G/DL — SIGNIFICANT CHANGE UP (ref 6–8.3)
RBC # BLD: 4.68 M/UL — SIGNIFICANT CHANGE UP (ref 3.8–5.2)
RBC # FLD: 12.9 % — SIGNIFICANT CHANGE UP (ref 10.3–14.5)
SODIUM SERPL-SCNC: 139 MMOL/L — SIGNIFICANT CHANGE UP (ref 135–145)
SPECIMEN SOURCE: SIGNIFICANT CHANGE UP
SPECIMEN SOURCE: SIGNIFICANT CHANGE UP
WBC # BLD: 6 K/UL — SIGNIFICANT CHANGE UP (ref 3.8–10.5)
WBC # FLD AUTO: 6 K/UL — SIGNIFICANT CHANGE UP (ref 3.8–10.5)

## 2022-08-08 PROCEDURE — 87040 BLOOD CULTURE FOR BACTERIA: CPT

## 2022-08-08 PROCEDURE — 81025 URINE PREGNANCY TEST: CPT

## 2022-08-08 PROCEDURE — 84484 ASSAY OF TROPONIN QUANT: CPT

## 2022-08-08 PROCEDURE — 81001 URINALYSIS AUTO W/SCOPE: CPT

## 2022-08-08 PROCEDURE — 99285 EMERGENCY DEPT VISIT HI MDM: CPT | Mod: 25

## 2022-08-08 PROCEDURE — 82962 GLUCOSE BLOOD TEST: CPT

## 2022-08-08 PROCEDURE — 87637 SARSCOV2&INF A&B&RSV AMP PRB: CPT

## 2022-08-08 PROCEDURE — 87640 STAPH A DNA AMP PROBE: CPT

## 2022-08-08 PROCEDURE — 87635 SARS-COV-2 COVID-19 AMP PRB: CPT

## 2022-08-08 PROCEDURE — 93005 ELECTROCARDIOGRAM TRACING: CPT

## 2022-08-08 PROCEDURE — 80053 COMPREHEN METABOLIC PANEL: CPT

## 2022-08-08 PROCEDURE — 83605 ASSAY OF LACTIC ACID: CPT

## 2022-08-08 PROCEDURE — 74019 RADEX ABDOMEN 2 VIEWS: CPT

## 2022-08-08 PROCEDURE — 96375 TX/PRO/DX INJ NEW DRUG ADDON: CPT

## 2022-08-08 PROCEDURE — 82550 ASSAY OF CK (CPK): CPT

## 2022-08-08 PROCEDURE — 74177 CT ABD & PELVIS W/CONTRAST: CPT | Mod: MA

## 2022-08-08 PROCEDURE — 83690 ASSAY OF LIPASE: CPT

## 2022-08-08 PROCEDURE — 87641 MR-STAPH DNA AMP PROBE: CPT

## 2022-08-08 PROCEDURE — 96374 THER/PROPH/DIAG INJ IV PUSH: CPT

## 2022-08-08 PROCEDURE — 80048 BASIC METABOLIC PNL TOTAL CA: CPT

## 2022-08-08 PROCEDURE — 85025 COMPLETE CBC W/AUTO DIFF WBC: CPT

## 2022-08-08 PROCEDURE — 87086 URINE CULTURE/COLONY COUNT: CPT

## 2022-08-08 PROCEDURE — 36415 COLL VENOUS BLD VENIPUNCTURE: CPT

## 2022-08-08 PROCEDURE — 86803 HEPATITIS C AB TEST: CPT

## 2022-08-08 PROCEDURE — 71045 X-RAY EXAM CHEST 1 VIEW: CPT

## 2022-08-08 PROCEDURE — 74250 X-RAY XM SM INT 1CNTRST STD: CPT

## 2022-08-08 PROCEDURE — 85730 THROMBOPLASTIN TIME PARTIAL: CPT

## 2022-08-08 PROCEDURE — 85610 PROTHROMBIN TIME: CPT

## 2022-08-08 RX ORDER — LORATADINE 10 MG/1
1 TABLET ORAL
Qty: 0 | Refills: 0 | DISCHARGE

## 2022-08-08 RX ORDER — CHOLECALCIFEROL (VITAMIN D3) 125 MCG
1 CAPSULE ORAL
Qty: 0 | Refills: 0 | DISCHARGE

## 2022-08-08 RX ORDER — SODIUM CHLORIDE 0.65 %
2 AEROSOL, SPRAY (ML) NASAL
Qty: 0 | Refills: 0 | DISCHARGE

## 2022-08-08 RX ADMIN — LAMOTRIGINE 300 MILLIGRAM(S): 25 TABLET, ORALLY DISINTEGRATING ORAL at 06:11

## 2022-08-08 RX ADMIN — Medication 10 MILLIGRAM(S): at 11:45

## 2022-08-08 RX ADMIN — PRIMIDONE 250 MILLIGRAM(S): 250 TABLET ORAL at 11:45

## 2022-08-08 NOTE — PROGRESS NOTE ADULT - REASON FOR ADMISSION
Abdominal pain nausea and vomiting x 1 day

## 2022-08-08 NOTE — DISCHARGE NOTE PROVIDER - CARE PROVIDER_API CALL
JULIANNA TELLEZ  General Practice  [address] [id number]  Phone: [phone]  Fax: [phone]  Follow Up Time:

## 2022-08-08 NOTE — DISCHARGE NOTE PROVIDER - NSDCCPCAREPLAN_GEN_ALL_CORE_FT
PRINCIPAL DISCHARGE DIAGNOSIS  Diagnosis: SBO (small bowel obstruction)  Assessment and Plan of Treatment: resolved surgery consulted tolerating diet

## 2022-08-08 NOTE — PROGRESS NOTE ADULT - SUBJECTIVE AND OBJECTIVE BOX
Neurology follow up note    DMITRI [id number]      Interval History:    Patient feels ok no new complaints.    Allergies    No Known Allergies    Intolerances        MEDICATIONS    lamoTRIgine 300 milliGRAM(s) Oral two times a day  PARoxetine 10 milliGRAM(s) Oral daily  primidone 250 milliGRAM(s) Oral daily  primidone 500 milliGRAM(s) Oral <User Schedule>              Vital Signs Last 24 Hrs  T(C): 36.4 (08 Aug 2022 08:04), Max: 36.8 (07 Aug 2022 23:30)  T(F): 97.6 (08 Aug 2022 08:04), Max: 98.2 (07 Aug 2022 23:30)  HR: 83 (08 Aug 2022 08:04) (72 - 83)  BP: 120/75 (08 Aug 2022 08:04) (116/75 - 130/79)  BP(mean): --  RR: 18 (08 Aug 2022 08:04) (16 - 18)  SpO2: 96% (08 Aug 2022 08:04) (96% - 96%)      REVIEW OF SYSTEMS:  Constitutional:  The patient denies fever, chills, night sweats.  Head:  No headache.  Eyes:  No double vision or blurry vision.  Ears:  No ringing in the ears.  Neck:  No neck pain.  Cardiovascular:  No chest pain.  Respiratory:  No shortness of breath.  Abdomen:  No nausea, vomiting, but positive abdominal pain which brought her to the emergency room.  Extremities/Neurological:  No numbness or tingling.  Musculoskeletal:  Occasional joint pain.      PHYSICAL EXAMINATION:  HEENT:  Head:  Normocephalic, atraumatic.  Eyes:  No scleral icterus.  Ears:  Hearing bilaterally intact.  NECK:  Supple.  CARDIOVASCULAR:  S1 and S2 heard.  RESPIRATORY:  Good air entry bilaterally.  ABDOMEN:  Soft.  EXTREMITIES:  No clubbing or cyanosis was noted.      NEUROLOGIC:  The patient is awake, alert.  Extraocular movements were intact.  Speech was fluent.  Smile symmetric.  Motor:  Bilateral upper 4/5.  Sensory:  Bilateral upper and lower intact to light touch.                    LABS:  CBC Full  -  ( 08 Aug 2022 07:55 )  WBC Count : 6.00 K/uL  RBC Count : 4.68 M/uL  Hemoglobin : 14.6 g/dL  Hematocrit : 43.0 %  Platelet Count - Automated : 347 K/uL  Mean Cell Volume : 91.9 fl  Mean Cell Hemoglobin : 31.2 pg  Mean Cell Hemoglobin Concentration : 34.0 gm/dL  Auto Neutrophil # : 3.04 K/uL  Auto Lymphocyte # : 2.05 K/uL  Auto Monocyte # : 0.71 K/uL  Auto Eosinophil # : 0.12 K/uL  Auto Basophil # : 0.05 K/uL  Auto Neutrophil % : 50.7 %  Auto Lymphocyte % : 34.2 %  Auto Monocyte % : 11.8 %  Auto Eosinophil % : 2.0 %  Auto Basophil % : 0.8 %            Hemoglobin A1C:       Vitamin B12         RADIOLOGY      ANALYSIS AND PLAN:  This is a 56-year-old with history of epilepsy, NPO.  For history of epilepsy lamictal.    For tremors, we will monitor off primidone.  GI followup and surgical followup as needed.  events noted yesterday possible seizure  event   seizure precautions     Greater than 40 minutes of time was spent with the patient, plan of care, reviewing data, with greater than 50% of the visit was spent counseling and/or coordinating care with multidisciplinary healthcare team

## 2022-08-08 NOTE — DISCHARGE NOTE PROVIDER - HOSPITAL COURSE
HPI:  56-year-old female with history of cerebral palsy, MR, seizures, chronic gastritis, bundle branch block brought in by staff from group home for evaluation of nausea, vomiting and abdominal pain today.  As per staff member patient was nauseous and  vomiting after breakfast this morning and on the way to the hospital started complaining of abdominal pain.  States that patient was recently hospitalized at [facility] for similar problem 3 weeks ago and unsure of diagnosis. Patient is a poor historian and unable to give further information. Denies fever, diarrhea, recent travel, known sick contacts, CP, SOB, urinary symptoms.    (03 Aug 2022 17:25)      # SBO resolved Surgery consulted     Advanced diet to regular     # Seizures Resume Primidone    Neuro consulted     D/C planning am tomorrow

## 2022-08-08 NOTE — PROGRESS NOTE ADULT - PROVIDER SPECIALTY LIST ADULT
Cardiology
Cardiology
Neurology
Surgery
Cardiology
Hospitalist
Neurology
Neurology
Surgery
Cardiology
Hospitalist
Hospitalist
Neurology
Surgery
Surgery
Hospitalist

## 2022-08-08 NOTE — DISCHARGE NOTE NURSING/CASE MANAGEMENT/SOCIAL WORK - PATIENT PORTAL LINK FT
You can access the FollowMyHealth Patient Portal offered by [facility] by registering at the following website: [email or web address]. By joining [email or web address]’s FollowMyHealth portal, you will also be able to view your health information using other applications (apps) compatible with our system.

## 2022-08-08 NOTE — DISCHARGE NOTE PROVIDER - NSDCMRMEDTOKEN_GEN_ALL_CORE_FT
Calcium 600+D oral tablet: 1 tab(s) orally 2 times a day  carbamide peroxide 6.5% otic solution: 5 drop(s) to each affected ear 2 times a day for 7 days once a month  lamoTRIgine 150 mg oral tablet: 2 tab(s) orally 2 times a day  Metamucil: 2 cap(s) orally once a day  PARoxetine 10 mg oral tablet: 1 tab(s) orally once a day  primidone 250 mg oral tablet: 1 tab(s) orally once a day  primidone 250 mg oral tablet: 2 tab(s) orally once a day (at bedtime)  RABEprazole 20 mg oral tablet, extended release: orally once a day

## 2022-08-08 NOTE — PROGRESS NOTE ADULT - ASSESSMENT
The patient is a 56 year old female with a history of seizure d/o, CP, developmental delay, LBBB who presents with abdominal pain and nausea in the setting of SBO.    Plan:  - ECG with known LBBB  - Cardiac enzymes negative  - NGT removed  - Surgery follow-up  - Discharge planning

## 2022-08-08 NOTE — PROGRESS NOTE ADULT - SUBJECTIVE AND OBJECTIVE BOX
Chief Complaint: Abdominal pain    Interval Events: No events overnight.    Review of Systems:  General: No fevers, chills, weight gain  Skin: No rashes, color changes  Cardiovascular: No chest pain, orthopnea  Respiratory: No shortness of breath, cough  Gastrointestinal: No nausea, abdominal pain  Genitourinary: No incontinence, pain with urination  Musculoskeletal: No pain, swelling, decreased range of motion  Neurological: No headache, weakness  Psychiatric: No depression, anxiety  Endocrine: No weight gain, increased thirst  All other systems are comprehensively negative.    Physical Exam:  Vital Signs Last 24 Hrs  T(C): 36.4 (08 Aug 2022 08:04), Max: 36.8 (07 Aug 2022 23:30)  T(F): 97.6 (08 Aug 2022 08:04), Max: 98.2 (07 Aug 2022 23:30)  HR: 83 (08 Aug 2022 08:04) (72 - 83)  BP: 120/75 (08 Aug 2022 08:04) (116/75 - 130/79)  BP(mean): --  RR: 18 (08 Aug 2022 08:04) (16 - 18)  SpO2: 96% (08 Aug 2022 08:04) (96% - 96%)  General: NAD  HEENT: MMM  Neck: No JVD, no carotid bruit  Lungs: CTAB  CV: RRR, nl S1/S2, no M/R/G  Abdomen: S/NT/ND, +BS  Extremities: No LE edema, no cyanosis  Neuro: AAOx3, non-focal  Skin: No rash    Labs:

## 2022-10-11 ENCOUNTER — EMERGENCY (EMERGENCY)
Facility: HOSPITAL | Age: 57
LOS: 1 days | Discharge: NURSING, REHAB OR LONG-TERM CARE | End: 2022-10-11
Attending: EMERGENCY MEDICINE | Admitting: EMERGENCY MEDICINE
Payer: MEDICARE

## 2022-10-11 VITALS
OXYGEN SATURATION: 98 % | SYSTOLIC BLOOD PRESSURE: 100 MMHG | RESPIRATION RATE: 18 BRPM | HEART RATE: 69 BPM | TEMPERATURE: 98 F | DIASTOLIC BLOOD PRESSURE: 63 MMHG

## 2022-10-11 VITALS
WEIGHT: 113.1 LBS | RESPIRATION RATE: 18 BRPM | HEIGHT: 57 IN | OXYGEN SATURATION: 96 % | TEMPERATURE: 97 F | HEART RATE: 69 BPM | DIASTOLIC BLOOD PRESSURE: 80 MMHG | SYSTOLIC BLOOD PRESSURE: 146 MMHG

## 2022-10-11 PROCEDURE — 70450 CT HEAD/BRAIN W/O DYE: CPT | Mod: 26,MA

## 2022-10-11 PROCEDURE — 70486 CT MAXILLOFACIAL W/O DYE: CPT | Mod: 26,MA

## 2022-10-11 PROCEDURE — 99284 EMERGENCY DEPT VISIT MOD MDM: CPT

## 2022-10-11 PROCEDURE — 70450 CT HEAD/BRAIN W/O DYE: CPT | Mod: MA

## 2022-10-11 PROCEDURE — 99284 EMERGENCY DEPT VISIT MOD MDM: CPT | Mod: 25

## 2022-10-11 PROCEDURE — 70486 CT MAXILLOFACIAL W/O DYE: CPT | Mod: MA

## 2022-10-11 RX ORDER — CARBAMIDE PEROXIDE 81.86 MG/ML
5 SOLUTION/ DROPS AURICULAR (OTIC)
Qty: 0 | Refills: 0 | DISCHARGE

## 2022-10-11 RX ORDER — PSYLLIUM SEED (WITH DEXTROSE)
2 POWDER (GRAM) ORAL
Qty: 0 | Refills: 0 | DISCHARGE

## 2022-10-11 RX ORDER — LORATADINE 10 MG/1
1 TABLET ORAL
Qty: 0 | Refills: 0 | DISCHARGE

## 2022-10-11 RX ORDER — POLYETHYLENE GLYCOL 3350 17 G/17G
17 POWDER, FOR SOLUTION ORAL
Qty: 0 | Refills: 0 | DISCHARGE

## 2022-10-11 RX ORDER — LAMOTRIGINE 25 MG/1
2 TABLET, ORALLY DISINTEGRATING ORAL
Qty: 0 | Refills: 0 | DISCHARGE

## 2022-10-11 RX ORDER — RABEPRAZOLE 20 MG/1
0 TABLET, DELAYED RELEASE ORAL
Qty: 0 | Refills: 0 | DISCHARGE

## 2022-10-11 RX ORDER — PRIMIDONE 250 MG/1
2 TABLET ORAL
Qty: 0 | Refills: 0 | DISCHARGE

## 2022-10-11 RX ORDER — ATORVASTATIN CALCIUM 80 MG/1
1 TABLET, FILM COATED ORAL
Qty: 0 | Refills: 0 | DISCHARGE

## 2022-10-11 RX ORDER — CHOLECALCIFEROL (VITAMIN D3) 125 MCG
1 CAPSULE ORAL
Qty: 0 | Refills: 0 | DISCHARGE

## 2022-10-11 RX ORDER — PRIMIDONE 250 MG/1
1 TABLET ORAL
Qty: 0 | Refills: 0 | DISCHARGE

## 2022-10-11 RX ORDER — ASPIRIN/CALCIUM CARB/MAGNESIUM 324 MG
1 TABLET ORAL
Qty: 0 | Refills: 0 | DISCHARGE

## 2022-10-11 RX ORDER — ICOSAPENT ETHYL 500 MG/1
2 CAPSULE, LIQUID FILLED ORAL
Qty: 0 | Refills: 0 | DISCHARGE

## 2022-10-11 NOTE — ED PROVIDER NOTE - PATIENT PORTAL LINK FT
You can access the FollowMyHealth Patient Portal offered by [facility] by registering at the following website: [email or web address]. By joining [employer]’s FollowMyHealth portal, you will also be able to view your health information using other applications (apps) compatible with our system.

## 2022-10-11 NOTE — ED ADULT NURSE NOTE - NSIMPLEMENTINTERV_GEN_ALL_ED
Implemented All Fall Risk Interventions:  [place] to call system. Call bell, personal items and telephone within reach. Instruct patient to call for assistance. Room bathroom lighting operational. Non-slip footwear when patient is off stretcher. Physically safe environment: no spills, clutter or unnecessary equipment. Stretcher in lowest position, wheels locked, appropriate side rails in place. Provide visual cue, wrist band, yellow gown, etc. Monitor gait and stability. Monitor for mental status changes and reorient to person, place, and time. Review medications for side effects contributing to fall risk. Reinforce activity limits and safety measures with patient and family.

## 2022-10-11 NOTE — ED ADULT NURSE NOTE - OBJECTIVE STATEMENT
55 yo from a [facility] group home complaining a nosebleed now resolved s/p falling while tying her shoe lace

## 2022-10-11 NOTE — ED PROVIDER NOTE - NSFOLLOWUPINSTRUCTIONS_ED_ALL_ED_FT
Facial or Scalp Contusion      A facial or scalp contusion is a bruise (contusion) on the face or head. Bruises happen when an injury causes bleeding under the skin. The bruise may turn blue, purple, or yellow (discoloration). Minor injuries may cause a bruise that is not painful. Some bruises are painful and swollen for a few weeks.    Injuries to the face and head usually cause a lot of swelling, especially around the eyes. You may have other injuries as well, such as broken bones or cuts.      What are the causes?    •An injury to the face or head from an object.      •A fall.      •A hit to the face or head area.      •Car accidents.      •Sports injuries.      •Attacks from another person (assaults).        What are the signs or symptoms?    •Swelling in the area of the injury. The swelling may be in a small areas and very noticeable.      •The injured area being a different color than normal.      •Pain or soreness in the injured area.      If you also have broken bones, your nose might be a different shape, you may be unable to close your mouth and you might have vision changes.      How is this treated?    •Applying cold compresses to the hurt area. This is often the best treatment.      •Taking over-the-counter medicines to help take the pain away, if your doctor tells you to take them.      •If there are any cuts, these will need to be repaired as well.      •Any deeper injuries may require treatment and follow up with a specialist, such as a surgeon or eye specialist.        Follow these instructions at home:      Managing pain, stiffness, and swelling   Bag of ice on a towel on the skin.  •If told, put ice on the injured area. To do this:  •Put ice in a plastic bag.      •Place a towel between your skin and the bag.      •Leave the ice on for 20 minutes, 2–3 times a day.      •Take off the ice if your skin turns bright red. This is very important. If you cannot feel pain, heat, or cold, you have a greater risk of damage to the area.        •Raise the injured area above the level of your heart while you are sitting or lying down.      General instructions     •Take over-the-counter and prescription medicines only as told by your doctor.      •Rest as told by your doctor.      •Return to your normal activities when your doctor says that it is safe.      • Do not blow your nose if you have any broken bones in your face.      •Eat soft foods if you are having jaw pain.      •Keep all follow-up visits.        Contact a doctor if:    •You have trouble biting or chewing.      •Your pain or swelling gets worse.      •The bruised area gets worse.        Get help right away if:    •You have very bad pain or a headache, and medicine does not help.      •You are very tired or confused.      •Your personality changes.      •You vomit.      •You have a nosebleed that does not stop.      •You see two of everything (double vision) or have blurry vision.      •You have clear fluid coming from your nose or ear, and it does not go away.      •You have problems walking or using your arms or legs.      •You feel very dizzy.        Summary    •A facial or scalp contusion is a bruise on the face or head.      •Bruises happen when an injury causes bleeding under the skin.      •Minor injuries will cause a bruise that is not painful, but worse bruises can stay painful and swollen for a few weeks.      •Go to a doctor if you have problems seeing, bleeding from your face or nose, or you have trouble biting or chewing.      •Applying cold compresses to the hurt area is often the best treatment.      This information is not intended to replace advice given to you by your health care provider. Make sure you discuss any questions you have with your health care provider. Nasal Fracture      A fracture is a break in a bone. A nasal fracture is a broken nose. Minor breaks do not need treatment. They often heal on their own in about a month.    Serious breaks may need treatment. Sometimes, surgery is needed.      What are the causes?    This condition is usually caused by a direct hit to the nose. This often occurs from:  •Playing a contact sport.       •Being in a car accident.      •Falling.       •Getting punched in the face.        What are the signs or symptoms?    •Pain.       •Swelling of the nose.       •Bleeding from the nose.       •Bruises around the nose or eyes, including black eyes.      •The nose having a crooked shape.        How is this treated?    Treatment depends on how bad the injury is.  •Minor breaks may heal on their own. They often do not need treatment.    •For more serious breaks that have caused bones to move out of position, treatment may include:  •Numbing the nose area with medicines and moving the bones back into position without surgery. Your doctor may be able to do this in his or her office.      •Surgery. If this is needed, it will be done after the swelling is gone.          Follow these instructions at home:    Activity     •Return to your normal activities when your doctor says that it is safe.      • Do not play contact sports for 3–4 weeks or as told by your doctor.      Managing pain and swelling     If told, put ice on the injured area. To do this:  •Put ice in a plastic bag.      •Place a towel between your skin and the bag.      •Leave the ice on for 20 minutes, 2–3 times a day.      •Take off the ice if your skin turns bright red. This is very important. If you cannot feel pain, heat, or cold, you have a greater risk of damage to the area.        General instructions      Bag of ice on a towel on the skin.       The correct and incorrect way to hold your head and fingers for first aid during a nosebleed.    •Take over-the-counter and prescription medicines only as told by your doctor.      •If your nose bleeds, sit up while you gently squeeze your nose shut for 10 minutes.      •Try to not blow your nose.      •Keep all follow-up visits.        Contact a doctor if:    •You have more pain or very bad pain.      •You keep having nosebleeds.      •The shape of your nose does not return to normal after 5 days.      •You have pus coming out of your nose.        Get help right away if:    •Your nose bleeds for more than 20 minutes.      •You have clear fluid draining out of your nose.      •You have a swelling on the inside of your nose that does not get better.      •You have trouble moving your eyes.      •You keep vomiting.      These symptoms may be an emergency. Get help right away. Call 911.   • Do not wait to see if the symptoms will go away.       • Do not drive yourself to the hospital.         Summary    •A nasal fracture is a broken nose.      •Symptoms include pain, swelling, and bruising.      •Minor breaks often do not require treatment. More serious breaks may require surgery or other treatments.      •If your nose bleeds, sit up while you gently squeeze your nose shut for 10 minutes.      This information is not intended to replace advice given to you by your health care provider. Make sure you discuss any questions you have with your health care provider.

## 2022-10-11 NOTE — ED PROVIDER NOTE - OBJECTIVE STATEMENT
55 y/o female with PMHx of cerebral palsy and mild intellectual disability presents to the ED c/o nosebleed s/p fall today. Nosebleed has since resolved. No other complaints at this time.

## 2022-10-11 NOTE — ED PROVIDER NOTE - ENMT, MLM
Airway patent, Nasal mucosa clear. Mouth with normal mucosa. Throat has no vesicles, no oropharyngeal exudates and uvula is midline. no scalp tenderness or deformity. no nose deformity or tenderness. tiny amount of dry blood noted in left nostril. no active bleeding.

## 2022-10-11 NOTE — ED PROVIDER NOTE - CARE PROVIDER_API CALL
JULIANNA TELLEZ  General Practice  [address] [id number]  Phone: [phone]  Fax: [phone]  Established Patient  Follow Up Time: 1-3 Days

## 2022-10-11 NOTE — ED ADULT TRIAGE NOTE - CHIEF COMPLAINT QUOTE
from group home and  leaned over and fell and hit face had a nose bleed earlier no bleeding now and pt offers no other c/o

## 2022-10-11 NOTE — ED PROVIDER NOTE - CLINICAL SUMMARY MEDICAL DECISION MAKING FREE TEXT BOX
Group home pt fell with nose injury. Plan CT brain and facial bones. If negative, return to group home with head injury instructions.

## 2023-01-06 PROBLEM — Z00.00 ENCOUNTER FOR PREVENTIVE HEALTH EXAMINATION: Status: ACTIVE | Noted: 2023-01-06

## 2023-01-26 NOTE — ED ADULT NURSE NOTE - BREATHING, MLM
[Alert] : alert [Acute Distress] : no acute distress [Normocephalic] : normocephalic [Flat Open Anterior [place]] : flat open anterior fontanelle [PERRL] : PERRL [Red Reflex Bilateral] : red reflex bilateral [Normally Placed Ears] : normally placed ears [Auricles Well Formed] : auricles well formed [Clear Tympanic membranes] : clear tympanic membranes [Light reflex present] : light reflex present [Bony landmarks visible] : bony landmarks visible [Discharge] : no discharge [Nares Patent] : nares patent [Palate Intact] : palate intact [Uvula Midline] : uvula midline [Supple, full passive range of motion] : supple, full passive range of motion [Palpable Masses] : no palpable masses [Symmetric Chest Rise] : symmetric chest rise [Clear to Auscultation Bilaterally] : clear to auscultation bilaterally [Regular Rate and Rhythm] : regular rate and rhythm [S1, S2 present] : S1, S2 present [Murmurs] : no murmurs [+2 Femoral Pulses] : +2 femoral pulses [Soft] : soft [Tender] : nontender [Distended] : not distended [Bowel Sounds] : bowel sounds present [Hepatomegaly] : no hepatomegaly [Splenomegaly] : no splenomegaly [Normal external genitailia] : normal external genitalia [Central Urethral Opening] : central urethral opening [Testicles Descended Bilaterally] : testicles descended bilaterally [Normally Placed] : normally placed [No Abnormal Lymph Nodes Palpated] : no abnormal lymph nodes palpated [Wilson-Ortolani] : negative Wilson-Ortolani [Symmetric Flexed Extremities] : symmetric flexed extremities [Spinal Dimple] : no spinal dimple [Tuft of Hair] : no tuft of hair [Startle Reflex] : startle reflex present [Suck Reflex] : suck reflex present [Rooting] : rooting reflex present [Palmar Grasp] : palmar grasp reflex present [Plantar Grasp] : plantar grasp reflex present [Symmetric [place]] : symmetric [place] [Rash and/or lesion present] : no rash/lesion Spontaneous, unlabored and symmetrical

## 2024-04-25 NOTE — ED PROVIDER NOTE - CPE EDP CARDIAC NORM
Bleeding that does not stop/Swelling that gets worse/Pain not relieved by Medications/Fever greater than (need to indicate Fahrenheit or Celsius)/Wound/Surgical Site with redness, or foul smelling discharge or pus/Numbness, tingling, color or temperature change to extremity normal...